# Patient Record
Sex: MALE | Race: OTHER | NOT HISPANIC OR LATINO | Employment: FULL TIME | URBAN - METROPOLITAN AREA
[De-identification: names, ages, dates, MRNs, and addresses within clinical notes are randomized per-mention and may not be internally consistent; named-entity substitution may affect disease eponyms.]

---

## 2017-08-06 ENCOUNTER — HOSPITAL ENCOUNTER (EMERGENCY)
Facility: HOSPITAL | Age: 62
Discharge: HOME | End: 2017-08-06
Attending: EMERGENCY MEDICINE | Admitting: EMERGENCY MEDICINE
Payer: COMMERCIAL

## 2017-08-06 ENCOUNTER — APPOINTMENT (EMERGENCY)
Dept: RADIOLOGY | Facility: HOSPITAL | Age: 62
End: 2017-08-06
Payer: COMMERCIAL

## 2017-08-06 VITALS
SYSTOLIC BLOOD PRESSURE: 183 MMHG | RESPIRATION RATE: 18 BRPM | HEART RATE: 64 BPM | OXYGEN SATURATION: 98 % | DIASTOLIC BLOOD PRESSURE: 99 MMHG | HEIGHT: 72 IN | WEIGHT: 230 LBS | BODY MASS INDEX: 31.15 KG/M2 | TEMPERATURE: 99 F

## 2017-08-06 DIAGNOSIS — R53.1 WEAKNESS: Primary | ICD-10-CM

## 2017-08-06 LAB
ALBUMIN SERPL BCP-MCNC: 3.4 G/DL (ref 3.5–5)
ALP SERPL-CCNC: 77 U/L (ref 46–116)
ALT SERPL W P-5'-P-CCNC: 83 U/L (ref 12–78)
ANION GAP SERPL CALCULATED.3IONS-SCNC: 7 MMOL/L (ref 4–13)
AST SERPL W P-5'-P-CCNC: 61 U/L (ref 5–45)
BACTERIA UR QL AUTO: ABNORMAL /HPF
BASOPHILS # BLD AUTO: 0.05 THOUSANDS/ΜL (ref 0–0.1)
BASOPHILS NFR BLD AUTO: 1 % (ref 0–1)
BILIRUB SERPL-MCNC: 0.47 MG/DL (ref 0.2–1)
BILIRUB UR QL STRIP: NEGATIVE
BUN SERPL-MCNC: 23 MG/DL (ref 5–25)
CALCIUM SERPL-MCNC: 8.6 MG/DL (ref 8.3–10.1)
CHLORIDE SERPL-SCNC: 106 MMOL/L (ref 100–108)
CLARITY UR: CLEAR
CO2 SERPL-SCNC: 28 MMOL/L (ref 21–32)
COLOR UR: YELLOW
COLOR, POC: NORMAL
CREAT SERPL-MCNC: 1.87 MG/DL (ref 0.6–1.3)
EOSINOPHIL # BLD AUTO: 0.36 THOUSAND/ΜL (ref 0–0.61)
EOSINOPHIL NFR BLD AUTO: 5 % (ref 0–6)
ERYTHROCYTE [DISTWIDTH] IN BLOOD BY AUTOMATED COUNT: 13.2 % (ref 11.6–15.1)
GFR SERPL CREATININE-BSD FRML MDRD: 37 ML/MIN/1.73SQ M
GLUCOSE SERPL-MCNC: 88 MG/DL (ref 65–140)
GLUCOSE UR STRIP-MCNC: NEGATIVE MG/DL
HCT VFR BLD AUTO: 46.6 % (ref 36.5–49.3)
HGB BLD-MCNC: 16.5 G/DL (ref 12–17)
HGB UR QL STRIP.AUTO: ABNORMAL
HYALINE CASTS #/AREA URNS LPF: ABNORMAL /LPF
KETONES UR STRIP-MCNC: NEGATIVE MG/DL
LEUKOCYTE ESTERASE UR QL STRIP: ABNORMAL
LYMPHOCYTES # BLD AUTO: 1.98 THOUSANDS/ΜL (ref 0.6–4.47)
LYMPHOCYTES NFR BLD AUTO: 25 % (ref 14–44)
MCH RBC QN AUTO: 31.9 PG (ref 26.8–34.3)
MCHC RBC AUTO-ENTMCNC: 35.4 G/DL (ref 31.4–37.4)
MCV RBC AUTO: 90 FL (ref 82–98)
MONOCYTES # BLD AUTO: 0.78 THOUSAND/ΜL (ref 0.17–1.22)
MONOCYTES NFR BLD AUTO: 10 % (ref 4–12)
NEUTROPHILS # BLD AUTO: 4.73 THOUSANDS/ΜL (ref 1.85–7.62)
NEUTS SEG NFR BLD AUTO: 59 % (ref 43–75)
NITRITE UR QL STRIP: NEGATIVE
NON-SQ EPI CELLS URNS QL MICRO: ABNORMAL /HPF
NRBC BLD AUTO-RTO: 0 /100 WBCS
NT-PROBNP SERPL-MCNC: 202 PG/ML
PH UR STRIP.AUTO: 5.5 [PH] (ref 4.5–8)
PLATELET # BLD AUTO: 227 THOUSANDS/UL (ref 149–390)
PMV BLD AUTO: 10.1 FL (ref 8.9–12.7)
POTASSIUM SERPL-SCNC: 4 MMOL/L (ref 3.5–5.3)
PROT SERPL-MCNC: 7.2 G/DL (ref 6.4–8.2)
PROT UR STRIP-MCNC: NEGATIVE MG/DL
RBC # BLD AUTO: 5.17 MILLION/UL (ref 3.88–5.62)
RBC #/AREA URNS AUTO: ABNORMAL /HPF
SODIUM SERPL-SCNC: 141 MMOL/L (ref 136–145)
SP GR UR STRIP.AUTO: 1.02 (ref 1–1.03)
SPECIMEN SOURCE: NORMAL
TROPONIN I BLD-MCNC: 0.01 NG/ML (ref 0–0.08)
UROBILINOGEN UR QL STRIP.AUTO: 0.2 E.U./DL
WBC # BLD AUTO: 7.92 THOUSAND/UL (ref 4.31–10.16)
WBC #/AREA URNS AUTO: ABNORMAL /HPF

## 2017-08-06 PROCEDURE — 99284 EMERGENCY DEPT VISIT MOD MDM: CPT

## 2017-08-06 PROCEDURE — 85025 COMPLETE CBC W/AUTO DIFF WBC: CPT | Performed by: EMERGENCY MEDICINE

## 2017-08-06 PROCEDURE — 84484 ASSAY OF TROPONIN QUANT: CPT

## 2017-08-06 PROCEDURE — 71020 HB CHEST X-RAY 2VW FRONTAL&LATL: CPT

## 2017-08-06 PROCEDURE — 81001 URINALYSIS AUTO W/SCOPE: CPT

## 2017-08-06 PROCEDURE — 93005 ELECTROCARDIOGRAM TRACING: CPT | Performed by: EMERGENCY MEDICINE

## 2017-08-06 PROCEDURE — 86618 LYME DISEASE ANTIBODY: CPT | Performed by: EMERGENCY MEDICINE

## 2017-08-06 PROCEDURE — 36415 COLL VENOUS BLD VENIPUNCTURE: CPT | Performed by: EMERGENCY MEDICINE

## 2017-08-06 PROCEDURE — 80053 COMPREHEN METABOLIC PANEL: CPT | Performed by: EMERGENCY MEDICINE

## 2017-08-06 PROCEDURE — 96360 HYDRATION IV INFUSION INIT: CPT

## 2017-08-06 PROCEDURE — 81002 URINALYSIS NONAUTO W/O SCOPE: CPT | Performed by: EMERGENCY MEDICINE

## 2017-08-06 PROCEDURE — 83880 ASSAY OF NATRIURETIC PEPTIDE: CPT | Performed by: EMERGENCY MEDICINE

## 2017-08-06 RX ORDER — DOXYCYCLINE HYCLATE 100 MG/1
100 CAPSULE ORAL 2 TIMES DAILY
Qty: 42 CAPSULE | Refills: 0 | Status: SHIPPED | OUTPATIENT
Start: 2017-08-06 | End: 2017-08-16

## 2017-08-06 RX ORDER — DOXYCYCLINE HYCLATE 100 MG/1
100 CAPSULE ORAL ONCE
Status: COMPLETED | OUTPATIENT
Start: 2017-08-06 | End: 2017-08-06

## 2017-08-06 RX ORDER — LOSARTAN POTASSIUM 100 MG/1
75 TABLET ORAL DAILY
COMMUNITY
End: 2018-10-05

## 2017-08-06 RX ORDER — HYDRALAZINE HYDROCHLORIDE 20 MG/ML
5 INJECTION INTRAMUSCULAR; INTRAVENOUS ONCE
Status: DISCONTINUED | OUTPATIENT
Start: 2017-08-06 | End: 2017-08-06

## 2017-08-06 RX ADMIN — DOXYCYCLINE HYCLATE 100 MG: 100 CAPSULE, GELATIN COATED ORAL at 21:39

## 2017-08-06 RX ADMIN — SODIUM CHLORIDE 1000 ML: 0.9 INJECTION, SOLUTION INTRAVENOUS at 19:31

## 2017-08-07 LAB
ATRIAL RATE: 54 BPM
P AXIS: 60 DEGREES
PR INTERVAL: 172 MS
QRS AXIS: -34 DEGREES
QRSD INTERVAL: 106 MS
QT INTERVAL: 438 MS
QTC INTERVAL: 415 MS
T WAVE AXIS: 12 DEGREES
VENTRICULAR RATE: 54 BPM

## 2017-08-08 LAB
B BURGDOR IGG SER IA-ACNC: 0.49
B BURGDOR IGM SER IA-ACNC: 0.32

## 2017-08-28 ENCOUNTER — TRANSCRIBE ORDERS (OUTPATIENT)
Dept: ADMINISTRATIVE | Facility: HOSPITAL | Age: 62
End: 2017-08-28

## 2017-08-28 DIAGNOSIS — R94.31 NONSPECIFIC ABNORMAL ELECTROCARDIOGRAM (ECG) (EKG): Primary | ICD-10-CM

## 2017-09-06 ENCOUNTER — HOSPITAL ENCOUNTER (OUTPATIENT)
Dept: NON INVASIVE DIAGNOSTICS | Facility: CLINIC | Age: 62
Discharge: HOME | End: 2017-09-06
Payer: COMMERCIAL

## 2017-09-06 DIAGNOSIS — R94.31 NONSPECIFIC ABNORMAL ELECTROCARDIOGRAM (ECG) (EKG): ICD-10-CM

## 2017-09-06 LAB
MAX DIASTOLIC BP: 80 MMHG
MAX HEART RATE: 122 BPM
MAX PREDICTED HEART RATE: 157 BPM
MAX. SYSTOLIC BP: 160 MMHG
PROTOCOL NAME: NORMAL
TARGET HR FORMULA: NORMAL
TEST INDICATION: NORMAL
TIME IN EXERCISE PHASE: 232 S

## 2017-09-06 PROCEDURE — 93017 CV STRESS TEST TRACING ONLY: CPT

## 2017-09-20 ENCOUNTER — HOSPITAL ENCOUNTER (OUTPATIENT)
Dept: NON INVASIVE DIAGNOSTICS | Facility: CLINIC | Age: 62
Discharge: HOME | End: 2017-09-20
Payer: COMMERCIAL

## 2017-09-20 DIAGNOSIS — R94.31 NONSPECIFIC ABNORMAL ELECTROCARDIOGRAM (ECG) (EKG): ICD-10-CM

## 2017-09-20 LAB
CHEST PAIN STATEMENT: NORMAL
MAX DIASTOLIC BP: 80 MMHG
MAX HEART RATE: 96 BPM
MAX PREDICTED HEART RATE: 157 BPM
MAX. SYSTOLIC BP: 142 MMHG
PROTOCOL NAME: NORMAL
REASON FOR TERMINATION: NORMAL
TARGET HR FORMULA: NORMAL
TEST INDICATION: NORMAL
TIME IN EXERCISE PHASE: 180 S

## 2017-09-20 PROCEDURE — 93017 CV STRESS TEST TRACING ONLY: CPT

## 2017-09-20 PROCEDURE — 78452 HT MUSCLE IMAGE SPECT MULT: CPT

## 2017-09-20 PROCEDURE — A9502 TC99M TETROFOSMIN: HCPCS

## 2017-09-20 RX ADMIN — REGADENOSON 0.4 MG: 0.08 INJECTION, SOLUTION INTRAVENOUS at 13:50

## 2017-10-04 DIAGNOSIS — I44.1 SECOND DEGREE ATRIOVENTRICULAR BLOCK: ICD-10-CM

## 2017-10-18 ENCOUNTER — TELEPHONE (OUTPATIENT)
Dept: INPATIENT UNIT | Facility: HOSPITAL | Age: 62
End: 2017-10-18

## 2017-10-19 ENCOUNTER — APPOINTMENT (OUTPATIENT)
Dept: RADIOLOGY | Facility: HOSPITAL | Age: 62
End: 2017-10-19
Payer: COMMERCIAL

## 2017-10-19 ENCOUNTER — HOSPITAL ENCOUNTER (OUTPATIENT)
Dept: NON INVASIVE DIAGNOSTICS | Facility: HOSPITAL | Age: 62
Discharge: HOME | End: 2017-10-20
Attending: INTERNAL MEDICINE | Admitting: INTERNAL MEDICINE
Payer: COMMERCIAL

## 2017-10-19 ENCOUNTER — ANESTHESIA (OUTPATIENT)
Dept: INPATIENT UNIT | Facility: HOSPITAL | Age: 62
End: 2017-10-19
Payer: COMMERCIAL

## 2017-10-19 ENCOUNTER — ANESTHESIA EVENT (OUTPATIENT)
Dept: INPATIENT UNIT | Facility: HOSPITAL | Age: 62
End: 2017-10-19
Payer: COMMERCIAL

## 2017-10-19 DIAGNOSIS — I44.1 SECOND DEGREE ATRIOVENTRICULAR BLOCK: ICD-10-CM

## 2017-10-19 LAB
ANION GAP SERPL CALCULATED.3IONS-SCNC: 7 MMOL/L (ref 4–13)
BASOPHILS # BLD AUTO: 0.05 THOUSANDS/ΜL (ref 0–0.1)
BASOPHILS NFR BLD AUTO: 1 % (ref 0–1)
BUN SERPL-MCNC: 17 MG/DL (ref 5–25)
CALCIUM SERPL-MCNC: 8.7 MG/DL (ref 8.3–10.1)
CHLORIDE SERPL-SCNC: 107 MMOL/L (ref 100–108)
CO2 SERPL-SCNC: 27 MMOL/L (ref 21–32)
CREAT SERPL-MCNC: 1.37 MG/DL (ref 0.6–1.3)
EOSINOPHIL # BLD AUTO: 0.37 THOUSAND/ΜL (ref 0–0.61)
EOSINOPHIL NFR BLD AUTO: 5 % (ref 0–6)
ERYTHROCYTE [DISTWIDTH] IN BLOOD BY AUTOMATED COUNT: 12.9 % (ref 11.6–15.1)
GFR SERPL CREATININE-BSD FRML MDRD: 55 ML/MIN/1.73SQ M
GLUCOSE P FAST SERPL-MCNC: 94 MG/DL (ref 65–99)
GLUCOSE SERPL-MCNC: 94 MG/DL (ref 65–140)
HCT VFR BLD AUTO: 48.1 % (ref 36.5–49.3)
HGB BLD-MCNC: 16.5 G/DL (ref 12–17)
LYMPHOCYTES # BLD AUTO: 2.14 THOUSANDS/ΜL (ref 0.6–4.47)
LYMPHOCYTES NFR BLD AUTO: 28 % (ref 14–44)
MAGNESIUM SERPL-MCNC: 2.6 MG/DL (ref 1.6–2.6)
MCH RBC QN AUTO: 31.4 PG (ref 26.8–34.3)
MCHC RBC AUTO-ENTMCNC: 34.3 G/DL (ref 31.4–37.4)
MCV RBC AUTO: 91 FL (ref 82–98)
MONOCYTES # BLD AUTO: 0.59 THOUSAND/ΜL (ref 0.17–1.22)
MONOCYTES NFR BLD AUTO: 8 % (ref 4–12)
NEUTROPHILS # BLD AUTO: 4.4 THOUSANDS/ΜL (ref 1.85–7.62)
NEUTS SEG NFR BLD AUTO: 58 % (ref 43–75)
NRBC BLD AUTO-RTO: 0 /100 WBCS
PLATELET # BLD AUTO: 209 THOUSANDS/UL (ref 149–390)
PMV BLD AUTO: 9.7 FL (ref 8.9–12.7)
POTASSIUM SERPL-SCNC: 4.3 MMOL/L (ref 3.5–5.3)
RBC # BLD AUTO: 5.26 MILLION/UL (ref 3.88–5.62)
SODIUM SERPL-SCNC: 141 MMOL/L (ref 136–145)
WBC # BLD AUTO: 7.57 THOUSAND/UL (ref 4.31–10.16)

## 2017-10-19 PROCEDURE — C1898 LEAD, PMKR, OTHER THAN TRANS: HCPCS

## 2017-10-19 PROCEDURE — C1892 INTRO/SHEATH,FIXED,PEEL-AWAY: HCPCS | Performed by: INTERNAL MEDICINE

## 2017-10-19 PROCEDURE — 71010 HB CHEST X-RAY 1 VIEW FRONTAL (PORTABLE): CPT

## 2017-10-19 PROCEDURE — 80048 BASIC METABOLIC PNL TOTAL CA: CPT | Performed by: PHYSICIAN ASSISTANT

## 2017-10-19 PROCEDURE — 93005 ELECTROCARDIOGRAM TRACING: CPT | Performed by: PHYSICIAN ASSISTANT

## 2017-10-19 PROCEDURE — 33208 INSRT HEART PM ATRIAL & VENT: CPT | Performed by: INTERNAL MEDICINE

## 2017-10-19 PROCEDURE — 83735 ASSAY OF MAGNESIUM: CPT | Performed by: PHYSICIAN ASSISTANT

## 2017-10-19 PROCEDURE — C1785 PMKR, DUAL, RATE-RESP: HCPCS

## 2017-10-19 PROCEDURE — C1769 GUIDE WIRE: HCPCS | Performed by: INTERNAL MEDICINE

## 2017-10-19 PROCEDURE — 85025 COMPLETE CBC W/AUTO DIFF WBC: CPT | Performed by: PHYSICIAN ASSISTANT

## 2017-10-19 RX ORDER — PROPOFOL 10 MG/ML
INJECTION, EMULSION INTRAVENOUS AS NEEDED
Status: DISCONTINUED | OUTPATIENT
Start: 2017-10-19 | End: 2017-10-19 | Stop reason: SURG

## 2017-10-19 RX ORDER — GENTAMICIN SULFATE 40 MG/ML
INJECTION, SOLUTION INTRAMUSCULAR; INTRAVENOUS CODE/TRAUMA/SEDATION MEDICATION
Status: COMPLETED | OUTPATIENT
Start: 2017-10-19 | End: 2017-10-19

## 2017-10-19 RX ORDER — LIDOCAINE HYDROCHLORIDE 10 MG/ML
INJECTION, SOLUTION INFILTRATION; PERINEURAL CODE/TRAUMA/SEDATION MEDICATION
Status: COMPLETED | OUTPATIENT
Start: 2017-10-19 | End: 2017-10-19

## 2017-10-19 RX ORDER — SODIUM CHLORIDE 9 MG/ML
50 INJECTION, SOLUTION INTRAVENOUS CONTINUOUS
Status: CANCELLED | OUTPATIENT
Start: 2017-10-19

## 2017-10-19 RX ORDER — LOSARTAN POTASSIUM 50 MG/1
75 TABLET ORAL DAILY
Status: DISCONTINUED | OUTPATIENT
Start: 2017-10-20 | End: 2017-10-20 | Stop reason: HOSPADM

## 2017-10-19 RX ORDER — ACETAMINOPHEN 325 MG/1
650 TABLET ORAL EVERY 4 HOURS PRN
Status: DISCONTINUED | OUTPATIENT
Start: 2017-10-19 | End: 2017-10-20 | Stop reason: HOSPADM

## 2017-10-19 RX ORDER — FENTANYL CITRATE 50 UG/ML
INJECTION, SOLUTION INTRAMUSCULAR; INTRAVENOUS AS NEEDED
Status: DISCONTINUED | OUTPATIENT
Start: 2017-10-19 | End: 2017-10-19 | Stop reason: SURG

## 2017-10-19 RX ORDER — CEFAZOLIN SODIUM 1 G/3ML
INJECTION, POWDER, FOR SOLUTION INTRAMUSCULAR; INTRAVENOUS AS NEEDED
Status: DISCONTINUED | OUTPATIENT
Start: 2017-10-19 | End: 2017-10-19 | Stop reason: SURG

## 2017-10-19 RX ORDER — SODIUM CHLORIDE 9 MG/ML
INJECTION, SOLUTION INTRAVENOUS CONTINUOUS PRN
Status: DISCONTINUED | OUTPATIENT
Start: 2017-10-19 | End: 2017-10-19 | Stop reason: SURG

## 2017-10-19 RX ORDER — PROPOFOL 10 MG/ML
INJECTION, EMULSION INTRAVENOUS CONTINUOUS PRN
Status: DISCONTINUED | OUTPATIENT
Start: 2017-10-19 | End: 2017-10-19 | Stop reason: SURG

## 2017-10-19 RX ORDER — OXYCODONE HYDROCHLORIDE AND ACETAMINOPHEN 5; 325 MG/1; MG/1
1 TABLET ORAL EVERY 4 HOURS PRN
Status: DISCONTINUED | OUTPATIENT
Start: 2017-10-19 | End: 2017-10-20 | Stop reason: HOSPADM

## 2017-10-19 RX ADMIN — CEFAZOLIN 2000 MG: 1 INJECTION, POWDER, FOR SOLUTION INTRAVENOUS at 16:29

## 2017-10-19 RX ADMIN — PROPOFOL 50 MG: 10 INJECTION, EMULSION INTRAVENOUS at 16:18

## 2017-10-19 RX ADMIN — PROPOFOL 150 MCG/KG/MIN: 10 INJECTION, EMULSION INTRAVENOUS at 16:18

## 2017-10-19 RX ADMIN — SODIUM CHLORIDE: 0.9 INJECTION, SOLUTION INTRAVENOUS at 16:12

## 2017-10-19 RX ADMIN — PROPOFOL 50 MG: 10 INJECTION, EMULSION INTRAVENOUS at 16:12

## 2017-10-19 RX ADMIN — GENTAMICIN SULFATE 80 MG: 40 INJECTION, SOLUTION INTRAMUSCULAR; INTRAVENOUS at 17:06

## 2017-10-19 RX ADMIN — LIDOCAINE HYDROCHLORIDE 20 ML: 10 INJECTION, SOLUTION INFILTRATION; PERINEURAL at 16:42

## 2017-10-19 RX ADMIN — FENTANYL CITRATE 50 MCG: 50 INJECTION, SOLUTION INTRAMUSCULAR; INTRAVENOUS at 16:34

## 2017-10-19 RX ADMIN — IOHEXOL 10 ML: 350 INJECTION, SOLUTION INTRAVENOUS at 16:47

## 2017-10-19 RX ADMIN — FENTANYL CITRATE 50 MCG: 50 INJECTION, SOLUTION INTRAMUSCULAR; INTRAVENOUS at 16:25

## 2017-10-19 NOTE — ANESTHESIA POSTPROCEDURE EVALUATION
Post-Op Assessment Note      CV Status:  Stable    Mental Status:  Alert and awake    Hydration Status:  Euvolemic    PONV Controlled:  Controlled    Airway Patency:  Patent    Post Op Vitals Reviewed: Yes          Staff: CRNA           BP      Temp      Pulse     Resp      SpO2

## 2017-10-19 NOTE — DISCHARGE INSTRUCTIONS
Please refer to post pacemaker implantation discharge instructions and restrictions and your pacemaker booklet/temporary card  Keep incision dry for one week  Do not use lotions/powders/creams on incision  Remove outer bandage 48 hours after implantation  Leave underlying steri-strips in place, they will either fall off on their own or will be removed at 2 week follow up appointment  No overhead reaching/pushing/pulling/lifting greater than 5-10lbs with left arm for one month  Please call the office if you notice redness, swelling, bleeding, or drainage from incision or if you develop fevers  AFTER PACEMAKER CARE:    If you have any questions, please call [phone] to speak with a nurse (8:30am-4pm, or [phone] after hours)  For appointments, please call [phone]  WHAT YOU SHOULD KNOW:   A pacemaker is a small, battery-powered device that is placed under your skin in your upper chest area with wires placed through a vein that lead directly into the heart  It helps regulate your heart rate and prevent your heart from beating too slowly                  AFTER YOU LEAVE:     Medicines:     · Pain medicine: You may need medicine to take away or decrease pain  ¨ Learn how to take your medicine  Ask what medicine and how much you should take  Be sure you know how, when, and how often to take it  Usually Over the counter pain medicine is sufficient to control pain (Acetominophen or Ibuprofen) Ask your doctor if you may take these  If this does not control your pain, narcotic pain killers may be prescribed, please call if you need prescription  ¨ Do not wait until the pain is severe before you take your medicine  Tell caregivers if your pain does not decrease  ¨ Pain medicine can make you dizzy or sleepy  Prevent falls by calling someone when you get out of bed or if you need help  Take your medicine as directed    Call your healthcare provider if you think your medicine is not helping or if you have side effects  Tell him if you are allergic to any medicine  Follow up with your cardiologist after your procedure: You will need a follow-up visit approximately 2 weeks after you leave the hospital  Your cardiologist will check your wound and make sure that your pacemaker is working correctly  Follow the instructions to check your pacemaker: Your cardiologist or primary healthcare provider will check your pacemaker and the battery regularly  He will use a computer to check your pacemaker over the telephone or wireless device which will be given to you  Pacemaker batteries usually last 5 to 10 years  The pacemaker unit will be replaced when the battery gets low  This is a simpler procedure than the original one to implant your pacemaker  Wound care:  Keep your incision dry for one week  Sponge/tub baths are preferred, try to avoid a shower for 7 days  Do not use lotions/powders/creams on incision  Remove outer bandage 48 hours after implantation  Leave underlying steri-strips in place, they will either fall off on their own or will be removed at 2 week follow up appointment  Please call the office if you notice redness, swelling, bleeding, or drainage from incision or if you develop fevers  Activity:   · Arm movement and lifting:  Be careful using the arm on the side of your pacemaker  Do not move your arm for the first 24 hours after your procedure  Do not  lift your arm above your shoulder or lift more than 10 pounds for one month after your procedure  Avoid pushing, pulling, or repetitive arm movements for one month  This helps the leads stay in place and helps your wound heal  Ask your caregiver when you can drive after your procedure  You may move your arm side to side without lifting above your shoulder, and do not need to wear a sling at home     · Typically driving is allowed after 1 week post pacemaker if you are stable, however in some cases it may be longer and you should discuss with your doctor before proceeding  · Sports:  Ask your caregiver when it is okay to play tennis, golf, basketball, or any sport that requires you to lift your arms  Do not play full contact sports, such as football, that could damage your pacemaker  Ask your cardiologist or primary healthcare provider how much and what kinds of physical activity are safe for you  Living with a pacemaker:   · Tell all caregivers you have a pacemaker: This includes surgeons, radiologists, and medical technicians  You may want to wear a medical alert ID bracelet or necklace that states that you have a pacemaker  · Carry your pacemaker ID card: Make sure you receive a pacemaker ID card  Carry it with you at all times  It lists important information about your pacemaker  Show it to airport security if you travel  · Avoid electrical interference:  Avoid welding equipment and other equipment with large magnets or electric fields  These things could interfere with how your pacemaker works  Use your cell phone on the ear opposite from your pacemaker  Do not carry your cell phone in your shirt pocket over your chest      · Some Pacemakers are MRI safe  Ask you doctor if it is safe to proceed with MRI and let the radiologist and staff know you have a pacemaker  · Do not touch the skin around your pacemaker: This can cause damage to the lead wires or move the pacemaker unit from where it should be  Contact your cardiologist or primary healthcare provider if:   · The area around your pacemaker has increasing amount of pain after surgery  The pain should improve over first few days after implantation  · The skin around your stitches has increasing redness, swelling, or has drainage  This may mean that you have an infection  · You have a fever  · You have chills, a cough, and feel weak or achy  These are also signs of infection  · Your feet or ankles are more swollen than your baseline  · Your Heart rate is less than 50 beats per minute     Seek care immediately if:   · Your bandage becomes soaked with blood  · Your pacemaker is swelling rapidly    · Your stitches open up  · You feel your heart suddenly beating very slowly or quickly  · You become too weak or dizzy to stand, or you pass out  · Your arm or leg feels warm, tender, and painful  It may look swollen and red  · You have chest pain that does not go away with rest or medicine  · You feel lightheaded, short of breath, and have chest pain  · You cough up blood  © 2014 [address] is for End User's use only and may not be sold, redistributed or otherwise used for commercial purposes  All illustrations and images included in CareNotes® are the copyrighted property of [employer]  or [address]  The above information is an  only  It is not intended as medical advice for individual conditions or treatments  Talk to your doctor, nurse or pharmacist before following any medical regimen to see if it is safe and effective for you

## 2017-10-19 NOTE — ANESTHESIA PREPROCEDURE EVALUATION
Review of Systems/Medical History  Patient summary reviewed  Chart reviewed  No history of anesthetic complications     Cardiovascular  EKG reviewed, Hypertension , Dysrhythmias, ,   Comment: Max HR 54 with stress test  Good LV, no perfusion defects  ,  Pulmonary  Smoker cigar smoker , ,        GI/Hepatic  Negative GI/hepatic ROS          Negative  ROS        Endo/Other  Negative endo/other ROS      GYN       Hematology  Negative hematology ROS      Musculoskeletal       Neurology  Negative neurology ROS      Psychology   Negative psychology ROS            Physical Exam    Airway    Mallampati score: II  TM Distance: >3 FB  Neck ROM: full     Dental   No notable dental hx     Cardiovascular      Pulmonary      Other Findings        Anesthesia Plan  ASA Score- 2       Anesthesia Type- IV sedation with anesthesia with ASA Monitors  Additional Monitors:   Airway Plan:     Comment: Poss GA B/U plan  Induction- intravenous  Informed Consent- Anesthetic plan and risks discussed with patient  I personally reviewed this patient with the CRNA  Discussed and agreed on the Anesthesia Plan with the CRNA  Shelia Sanchez

## 2017-10-20 ENCOUNTER — APPOINTMENT (OUTPATIENT)
Dept: RADIOLOGY | Facility: HOSPITAL | Age: 62
End: 2017-10-20
Attending: INTERNAL MEDICINE
Payer: COMMERCIAL

## 2017-10-20 VITALS
TEMPERATURE: 100 F | DIASTOLIC BLOOD PRESSURE: 90 MMHG | SYSTOLIC BLOOD PRESSURE: 156 MMHG | HEIGHT: 72 IN | WEIGHT: 230 LBS | HEART RATE: 60 BPM | RESPIRATION RATE: 18 BRPM | BODY MASS INDEX: 31.15 KG/M2 | OXYGEN SATURATION: 95 %

## 2017-10-20 LAB
ANION GAP SERPL CALCULATED.3IONS-SCNC: 6 MMOL/L (ref 4–13)
ATRIAL RATE: 59 BPM
ATRIAL RATE: 60 BPM
BUN SERPL-MCNC: 18 MG/DL (ref 5–25)
CALCIUM SERPL-MCNC: 8.6 MG/DL (ref 8.3–10.1)
CHLORIDE SERPL-SCNC: 107 MMOL/L (ref 100–108)
CO2 SERPL-SCNC: 28 MMOL/L (ref 21–32)
CREAT SERPL-MCNC: 1.37 MG/DL (ref 0.6–1.3)
ERYTHROCYTE [DISTWIDTH] IN BLOOD BY AUTOMATED COUNT: 13.2 % (ref 11.6–15.1)
GFR SERPL CREATININE-BSD FRML MDRD: 55 ML/MIN/1.73SQ M
GLUCOSE SERPL-MCNC: 98 MG/DL (ref 65–140)
HCT VFR BLD AUTO: 48.6 % (ref 36.5–49.3)
HGB BLD-MCNC: 16.9 G/DL (ref 12–17)
MCH RBC QN AUTO: 31.5 PG (ref 26.8–34.3)
MCHC RBC AUTO-ENTMCNC: 34.8 G/DL (ref 31.4–37.4)
MCV RBC AUTO: 91 FL (ref 82–98)
P AXIS: -20 DEGREES
P AXIS: 36 DEGREES
PLATELET # BLD AUTO: 210 THOUSANDS/UL (ref 149–390)
PMV BLD AUTO: 9.9 FL (ref 8.9–12.7)
POTASSIUM SERPL-SCNC: 4.5 MMOL/L (ref 3.5–5.3)
PR INTERVAL: 154 MS
PR INTERVAL: 164 MS
QRS AXIS: -34 DEGREES
QRS AXIS: 17 DEGREES
QRSD INTERVAL: 114 MS
QRSD INTERVAL: 118 MS
QT INTERVAL: 438 MS
QT INTERVAL: 444 MS
QTC INTERVAL: 438 MS
QTC INTERVAL: 439 MS
RBC # BLD AUTO: 5.36 MILLION/UL (ref 3.88–5.62)
SODIUM SERPL-SCNC: 141 MMOL/L (ref 136–145)
T WAVE AXIS: -3 DEGREES
T WAVE AXIS: 0 DEGREES
VENTRICULAR RATE: 59 BPM
VENTRICULAR RATE: 60 BPM
WBC # BLD AUTO: 7.96 THOUSAND/UL (ref 4.31–10.16)

## 2017-10-20 PROCEDURE — 80048 BASIC METABOLIC PNL TOTAL CA: CPT | Performed by: PHYSICIAN ASSISTANT

## 2017-10-20 PROCEDURE — 71020 HB CHEST X-RAY 2VW FRONTAL&LATL: CPT

## 2017-10-20 PROCEDURE — 85027 COMPLETE CBC AUTOMATED: CPT | Performed by: PHYSICIAN ASSISTANT

## 2017-10-20 RX ADMIN — LOSARTAN POTASSIUM 75 MG: 50 TABLET, FILM COATED ORAL at 09:56

## 2017-10-20 NOTE — PROGRESS NOTES
Patient with no acute issues overnight, denies chest pain, dyspnea, palpitations, dizziness or lightheadedness  Incision is c/d/i without swelling, hematoma, redness, bleeding, drainage, or signs of infection  CXR shows appropriate device placement without pneumothorax  VSS, labs reviewed  Device interrogation shows appropriate device function, including lead sensing, threshold, and impedance  Physical exam shows patient in NAD, AAO x 3, RRR no murmurs, rubs, or gallops appreciated, lungs CTA b/l without wheezes, rhonchi or rales, LE with no significant clubbing, cyanosis, or edema b/l  Telemetry shows atrial pacing with intact conduction, no arrhythmias  Discharge instructions and restrictions reviewed in detail, follow up appointment arranged  Patient will continue on prior medical regimen of losartan  No changes were made  Patient is stable for discharge at this time with all questions answered

## 2017-10-20 NOTE — CASE MANAGEMENT
10/19/17 1603  Outpatient No Charge Bed Once     Transfer Service: Cardiology       Question Answer Comment   Admitting Physician Gisela Green    Bed request comments telemetry            Cardiac pacer implant    /90   Pulse 60   Temp 100 °F (37 8 °C) (Oral)   Resp 18   Ht 6' (1 829 m)   Wt 104 kg (230 lb)   SpO2 95%   BMI 31 19 kg/m²     Scheduled Meds:  losartan 75 mg Oral Daily     Continuous Infusions:   PRN Meds:   acetaminophen    oxyCODONE-acetaminophen        ------------------------------------------------------------------------------------------------------------------------------    Lashawn Menon PA-C Physician Assistant Cosign Needed Electrophysiology  Progress Notes Date of Service: 10/20/2017  9:32 AM   Patient with no acute issues overnight, denies chest pain, dyspnea, palpitations, dizziness or lightheadedness  Incision is c/d/i without swelling, hematoma, redness, bleeding, drainage, or signs of infection  CXR shows appropriate device placement without pneumothorax  VSS, labs reviewed  Device interrogation shows appropriate device function, including lead sensing, threshold, and impedance  Physical exam shows patient in NAD, AAO x 3, RRR no murmurs, rubs, or gallops appreciated, lungs CTA b/l without wheezes, rhonchi or rales, LE with no significant clubbing, cyanosis, or edema b/l  Telemetry shows atrial pacing with intact conduction, no arrhythmias  Discharge instructions and restrictions reviewed in detail, follow up appointment arranged  Patient will continue on prior medical regimen of losartan  No changes were made   Patient is stable for discharge at this time with all questions answered

## 2018-01-17 ENCOUNTER — ALLSCRIPTS OFFICE VISIT (OUTPATIENT)
Dept: OTHER | Facility: OTHER | Age: 63
End: 2018-01-17

## 2018-01-17 ENCOUNTER — GENERIC CONVERSION - ENCOUNTER (OUTPATIENT)
Dept: OTHER | Facility: OTHER | Age: 63
End: 2018-01-17

## 2018-03-07 NOTE — PROGRESS NOTES
"  Discussion/Summary  Normal device function      Results/Data  Cardiac Device In Clinic [id number] 08:03PM Sarah Rodriguez     Test Name Result Flag Reference   MISCELLANEOUS COMMENT (Report)     DEVICE INTERROGATED IN THE [place] OFFICE  BATTERY VOLTAGE ADEQUATE (10 5 YRS)  AP 31 1%  0 4%  ALL LEAD PARAMETERS WITHIN NORMAL LIMITS  NO SIGNIFICANT HIGH RATE EPISODES  DECREASE MADE TO RA & RV AMPLITUDE TO PROMOTE DEVICE LONGEVITY WHILE MAINTAINING AN APPROPRIATE SAFETY MARGIN  PACEMAKER FUNCTIONING APPROPRIATELY  CP   Cardiac Electrophysiology Report      [id number]  pdf   DEVICE TYPE Pacemaker       Cardiac Electrophysiology Report [id number] 08:03PM Sarah Rodriguez     Test Name Result Flag Reference   Cardiac Electrophysiology Report      [id number]  pdf     Signatures   Electronically signed by : Neo Ortiz, ; Jan 17 2018  3:46PM EST                       (Author)    Electronically signed by : Lukasz Flores DO; Jan 21 2018  3:44PM EST                       (Author)    "

## 2018-04-20 ENCOUNTER — IN-CLINIC DEVICE VISIT (OUTPATIENT)
Dept: CARDIOLOGY CLINIC | Facility: CLINIC | Age: 63
End: 2018-04-20
Payer: COMMERCIAL

## 2018-04-20 DIAGNOSIS — I44.1 AV BLOCK, 2ND DEGREE: Primary | ICD-10-CM

## 2018-04-20 DIAGNOSIS — Z95.0 CARDIAC PACEMAKER: ICD-10-CM

## 2018-04-20 PROCEDURE — 93296 REM INTERROG EVL PM/IDS: CPT | Performed by: INTERNAL MEDICINE

## 2018-05-31 NOTE — PROGRESS NOTES
DR Veena Valerio MDT DUAL PACEMAKER  CARELINK TRANSMISSION: BATTERY VOLTAGE ADEQUATE (10 YRS)  AP - 37 4%  - 35 3%  ALL AVAILABLE LEAD PARAMETERS WITHIN NORMAL LIMITS  NO SIGNIFICANT HIGH RATE EPISODES  NORMAL DEVICE FUNCTION     eb

## 2018-07-23 ENCOUNTER — REMOTE DEVICE CLINIC VISIT (OUTPATIENT)
Dept: CARDIOLOGY CLINIC | Facility: CLINIC | Age: 63
End: 2018-07-23
Payer: COMMERCIAL

## 2018-07-23 DIAGNOSIS — Z95.0 PACEMAKER: ICD-10-CM

## 2018-07-23 DIAGNOSIS — I44.30 AV BLOCK: Primary | ICD-10-CM

## 2018-07-23 PROCEDURE — 93296 REM INTERROG EVL PM/IDS: CPT | Performed by: INTERNAL MEDICINE

## 2018-07-23 NOTE — PROGRESS NOTES
Results for orders placed or performed in visit on 07/23/18   Cardiac EP device report    Narrative    DR DANNY WAYNET DUAL PACEMAKER  CARELINK TRANSMISSION: BATTERY ADEQUATE (9 YRS)  AP 54%;  90%  ALL LEAD PARAMETERS WITHIN NORMAL LIMITS  1 MONITORED AT-AF EPISODE WITH AVAILABLE MARKER/EGM SHOWING AT WITH COMPETITIVE ATRIAL PACING  NORMAL DEVICE FUNCTION   PL

## 2018-10-05 ENCOUNTER — HOSPITAL ENCOUNTER (OUTPATIENT)
Facility: HOSPITAL | Age: 63
Setting detail: OBSERVATION
Discharge: HOME | End: 2018-10-05
Attending: EMERGENCY MEDICINE | Admitting: INTERNAL MEDICINE
Payer: COMMERCIAL

## 2018-10-05 ENCOUNTER — APPOINTMENT (EMERGENCY)
Dept: RADIOLOGY | Facility: HOSPITAL | Age: 63
End: 2018-10-05
Payer: COMMERCIAL

## 2018-10-05 VITALS
HEIGHT: 72 IN | WEIGHT: 214 LBS | HEART RATE: 65 BPM | TEMPERATURE: 98 F | RESPIRATION RATE: 20 BRPM | BODY MASS INDEX: 28.99 KG/M2 | SYSTOLIC BLOOD PRESSURE: 135 MMHG | OXYGEN SATURATION: 96 % | DIASTOLIC BLOOD PRESSURE: 85 MMHG

## 2018-10-05 DIAGNOSIS — R42 LIGHTHEADEDNESS: Primary | ICD-10-CM

## 2018-10-05 DIAGNOSIS — I62.9 INTRACRANIAL BLEED (HCC): ICD-10-CM

## 2018-10-05 PROBLEM — I10 HYPERTENSION: Status: ACTIVE | Noted: 2018-10-05

## 2018-10-05 LAB
ALBUMIN SERPL BCP-MCNC: 3.8 G/DL (ref 3.5–5)
ALP SERPL-CCNC: 80 U/L (ref 46–116)
ALT SERPL W P-5'-P-CCNC: 50 U/L (ref 12–78)
ANION GAP SERPL CALCULATED.3IONS-SCNC: 5 MMOL/L (ref 4–13)
AST SERPL W P-5'-P-CCNC: 29 U/L (ref 5–45)
ATRIAL RATE: 59 BPM
BACTERIA UR QL AUTO: ABNORMAL /HPF
BASOPHILS # BLD AUTO: 0.05 THOUSANDS/ΜL (ref 0–0.1)
BASOPHILS NFR BLD AUTO: 1 % (ref 0–1)
BILIRUB SERPL-MCNC: 0.86 MG/DL (ref 0.2–1)
BILIRUB UR QL STRIP: NEGATIVE
BUN SERPL-MCNC: 23 MG/DL (ref 5–25)
CALCIUM SERPL-MCNC: 9.1 MG/DL (ref 8.3–10.1)
CHLORIDE SERPL-SCNC: 105 MMOL/L (ref 100–108)
CLARITY UR: ABNORMAL
CO2 SERPL-SCNC: 30 MMOL/L (ref 21–32)
COLOR UR: YELLOW
CREAT SERPL-MCNC: 1.36 MG/DL (ref 0.6–1.3)
EOSINOPHIL # BLD AUTO: 0.26 THOUSAND/ΜL (ref 0–0.61)
EOSINOPHIL NFR BLD AUTO: 3 % (ref 0–6)
ERYTHROCYTE [DISTWIDTH] IN BLOOD BY AUTOMATED COUNT: 12.7 % (ref 11.6–15.1)
GFR SERPL CREATININE-BSD FRML MDRD: 55 ML/MIN/1.73SQ M
GLUCOSE SERPL-MCNC: 97 MG/DL (ref 65–140)
GLUCOSE UR STRIP-MCNC: NEGATIVE MG/DL
HCT VFR BLD AUTO: 49 % (ref 36.5–49.3)
HGB BLD-MCNC: 16.4 G/DL (ref 12–17)
HGB UR QL STRIP.AUTO: NEGATIVE
IMM GRANULOCYTES # BLD AUTO: 0.02 THOUSAND/UL (ref 0–0.2)
IMM GRANULOCYTES NFR BLD AUTO: 0 % (ref 0–2)
KETONES UR STRIP-MCNC: NEGATIVE MG/DL
LEUKOCYTE ESTERASE UR QL STRIP: ABNORMAL
LYMPHOCYTES # BLD AUTO: 1.57 THOUSANDS/ΜL (ref 0.6–4.47)
LYMPHOCYTES NFR BLD AUTO: 21 % (ref 14–44)
MAGNESIUM SERPL-MCNC: 2.5 MG/DL (ref 1.6–2.6)
MCH RBC QN AUTO: 30.7 PG (ref 26.8–34.3)
MCHC RBC AUTO-ENTMCNC: 33.5 G/DL (ref 31.4–37.4)
MCV RBC AUTO: 92 FL (ref 82–98)
MONOCYTES # BLD AUTO: 0.5 THOUSAND/ΜL (ref 0.17–1.22)
MONOCYTES NFR BLD AUTO: 7 % (ref 4–12)
NEUTROPHILS # BLD AUTO: 5.23 THOUSANDS/ΜL (ref 1.85–7.62)
NEUTS SEG NFR BLD AUTO: 68 % (ref 43–75)
NITRITE UR QL STRIP: NEGATIVE
NON-SQ EPI CELLS URNS QL MICRO: ABNORMAL /HPF
NRBC BLD AUTO-RTO: 0 /100 WBCS
P AXIS: 91 DEGREES
PH UR STRIP.AUTO: 5.5 [PH] (ref 4.5–8)
PLATELET # BLD AUTO: 231 THOUSANDS/UL (ref 149–390)
PMV BLD AUTO: 9.7 FL (ref 8.9–12.7)
POTASSIUM SERPL-SCNC: 4.4 MMOL/L (ref 3.5–5.3)
PR INTERVAL: 184 MS
PROT SERPL-MCNC: 8.2 G/DL (ref 6.4–8.2)
PROT UR STRIP-MCNC: NEGATIVE MG/DL
QRS AXIS: -67 DEGREES
QRSD INTERVAL: 122 MS
QT INTERVAL: 416 MS
QTC INTERVAL: 411 MS
RBC # BLD AUTO: 5.35 MILLION/UL (ref 3.88–5.62)
RBC #/AREA URNS AUTO: ABNORMAL /HPF
SODIUM SERPL-SCNC: 140 MMOL/L (ref 136–145)
SP GR UR STRIP.AUTO: 1 (ref 1–1.03)
T WAVE AXIS: 114 DEGREES
UROBILINOGEN UR QL STRIP.AUTO: 0.2 E.U./DL
VENTRICULAR RATE: 59 BPM
WBC # BLD AUTO: 7.63 THOUSAND/UL (ref 4.31–10.16)
WBC #/AREA URNS AUTO: ABNORMAL /HPF

## 2018-10-05 PROCEDURE — 93010 ELECTROCARDIOGRAM REPORT: CPT | Performed by: INTERNAL MEDICINE

## 2018-10-05 PROCEDURE — 83735 ASSAY OF MAGNESIUM: CPT | Performed by: STUDENT IN AN ORGANIZED HEALTH CARE EDUCATION/TRAINING PROGRAM

## 2018-10-05 PROCEDURE — 70450 CT HEAD/BRAIN W/O DYE: CPT

## 2018-10-05 PROCEDURE — 93005 ELECTROCARDIOGRAM TRACING: CPT

## 2018-10-05 PROCEDURE — 71046 X-RAY EXAM CHEST 2 VIEWS: CPT

## 2018-10-05 PROCEDURE — 36415 COLL VENOUS BLD VENIPUNCTURE: CPT | Performed by: STUDENT IN AN ORGANIZED HEALTH CARE EDUCATION/TRAINING PROGRAM

## 2018-10-05 PROCEDURE — 81001 URINALYSIS AUTO W/SCOPE: CPT | Performed by: INTERNAL MEDICINE

## 2018-10-05 PROCEDURE — 99285 EMERGENCY DEPT VISIT HI MDM: CPT

## 2018-10-05 PROCEDURE — 85025 COMPLETE CBC W/AUTO DIFF WBC: CPT | Performed by: STUDENT IN AN ORGANIZED HEALTH CARE EDUCATION/TRAINING PROGRAM

## 2018-10-05 PROCEDURE — 80053 COMPREHEN METABOLIC PANEL: CPT | Performed by: STUDENT IN AN ORGANIZED HEALTH CARE EDUCATION/TRAINING PROGRAM

## 2018-10-05 RX ORDER — LOSARTAN POTASSIUM 100 MG/1
75 TABLET ORAL
COMMUNITY
End: 2018-11-12 | Stop reason: SDUPTHER

## 2018-10-05 RX ORDER — AMLODIPINE BESYLATE 5 MG/1
5 TABLET ORAL DAILY
COMMUNITY
End: 2018-11-12 | Stop reason: SDUPTHER

## 2018-10-05 RX ORDER — AMLODIPINE BESYLATE 5 MG/1
5 TABLET ORAL DAILY
Status: DISCONTINUED | OUTPATIENT
Start: 2018-10-06 | End: 2018-10-06 | Stop reason: HOSPADM

## 2018-10-05 RX ORDER — HYDRALAZINE HYDROCHLORIDE 20 MG/ML
5 INJECTION INTRAMUSCULAR; INTRAVENOUS EVERY 8 HOURS PRN
Status: DISCONTINUED | OUTPATIENT
Start: 2018-10-05 | End: 2018-10-06 | Stop reason: HOSPADM

## 2018-10-05 RX ADMIN — HYDRALAZINE HYDROCHLORIDE 5 MG: 20 INJECTION INTRAMUSCULAR; INTRAVENOUS at 19:05

## 2018-10-05 NOTE — PROGRESS NOTES
[facility] [place] [facility]   Unit/Bed # @DBLINK (SHEILA,55548)@ Encounter: [id number]  SOD Team C           Solitario Negron 58 y o  male [id number]       Patient seen and examined  Reviewed H&P per Dr Kenney Player  Agree with the assessment and plan unless otherwise noted  58year old male with past medical history of bradycardia sp pacemaker and htn who presented to B with complaint of left leg weakness  Pt felt weak and felt like his leg was going to give out, he also felt that his leg was numb  Pt reported that all of this started about one week ago  He has been feeling these symptoms on and off and today they were worse so he decided to come in  Pt decided to come into the ED  ROS was negative  CT head showed punctate hyperdense foci in the right corona radiata, there was no surrounding edema and this likely represents dystrophic calcification or possibly developmental venous anomalies; however, without prior imaging, small subarachnoid blood could not  be excluded  Pt will be admitted under obs to SOD C       Lipids:  LDL 80  HDL 48 Trig 108 in 8/2018     Assessment/Plan: Principal Problem:    Intracranial bleed (HCC)  Active Problems:    Hypertension     Plan:   -Admit obs   -CT in 24 hours   -Continue home meds   -Check A1c     Disposition:  OBSERVATION    Expected LOS: <2 9 Lindsey Shore DO

## 2018-10-05 NOTE — ED ATTENDING ATTESTATION
Emery Cavanaugh DO, saw and evaluated the patient  I have discussed the patient with the resident/non-physician practitioner and agree with the resident's/non-physician practitioner's findings, Plan of Care, and MDM as documented in the resident's/non-physician practitioner's note, except where noted  All available labs and Radiology studies were reviewed  At this point I agree with the current assessment done in the Emergency Department  I have conducted an independent evaluation of this patient a history and physical is as follows:    58 yom became lightheaded while working outside  Had tingling in l foot  No HA    Past Medical History:   Diagnosis Date    Bradycardia      Past Surgical History:   Procedure Laterality Date    CARDIAC PACEMAKER PLACEMENT  2017     /84 (BP Location: Right arm)   Pulse 59   Temp 97 8 °F (36 6 °C) (Oral)   Resp 18   Ht 6' (1 829 m)   Wt 97 1 kg (214 lb)   SpO2 97%   BMI 29 02 kg/m²   A&Ox4  CN 2-12 grossly intact  CTA  RRR  Ab soft, NT    CT finding possibly c/w SAH per radiology  Presentation inconsistent with subarachnoid hemorrhage, will discuss with Neurology and recommended admission for serial neurologic exams and repeat CT of the brain tomorrow as recommended by Radiology      Diagnosis  Lightheaded  Abnormal CT      Critical Care Time  CritCare Time    Procedures

## 2018-10-05 NOTE — ED PROVIDER NOTES
History  Chief Complaint   Patient presents with    Dizziness     Pt" I was at work ealier when my legs started to feel like they were going to out on me  I started to get a little dizzy ' Pt denies CP, SOB, n/v  Pt denies previous occurances in the past  Pt c/o numbness on the "ball of the left foot"      This is a 65-year-old male with a past medical history of bradycardia status post pacemaker, and hypertension who presents to the emergency department this afternoon with lightheadedness  Patient states that he was at work, outside when he got up from sitting and took a few steps before feeling a little lightheaded  Patient felt like his legs, especially his left leg, were about to give out so he walked a little further and sat down on the chair, feeling a little better afterwards  Patient states that he still feels a little bit of numbness and tingling in the ball of his left foot so he decided to come to the emergency department for further evaluation  Patient denies any headaches, change in vision, weakness, numbness, fevers, chills, nausea, vomiting, diarrhea, constipation, chest pain, shortness of breath, dysuria, hematuria, or extremity swelling  Patient states that his brother was recently diagnosed with brain cancer and lost function in his left leg so he was concerned that he might be suffering from the same thing  Patient takes amlodipine and losartan at home for his high blood pressure and states that he has been taking it as prescribed and new medication is new  He is only taking vitamin-C as a supplement  Patient smokes a cigar weekly, denies alcohol and denies drug use  Prior to Admission Medications   Prescriptions Last Dose Informant Patient Reported? Taking?    amLODIPine (NORVASC) 5 mg tablet 10/5/2018 at 0400  Yes Yes   Sig: Take 5 mg by mouth daily   losartan (COZAAR) 100 MG tablet 10/5/2018 at 0400  Yes No   Sig: Take 75 mg by mouth      Facility-Administered Medications: None       Past Medical History:   Diagnosis Date    Bradycardia        Past Surgical History:   Procedure Laterality Date    CARDIAC PACEMAKER PLACEMENT  2017       No family history on file  I have reviewed and agree with the history as documented  Social History   Substance Use Topics    Smoking status: Current Some Day Smoker     Types: Cigars    Smokeless tobacco: Not on file      Comment: 1 a week    Alcohol use No        Review of Systems   Constitutional: Negative for chills, diaphoresis and fever  HENT: Negative for congestion, rhinorrhea, sinus pressure and sore throat  Eyes: Negative for visual disturbance  Respiratory: Negative for cough, chest tightness and shortness of breath  Cardiovascular: Negative for chest pain  Gastrointestinal: Negative for abdominal pain, constipation, diarrhea, nausea and vomiting  Genitourinary: Negative for dysuria, frequency, hematuria and urgency  Musculoskeletal: Negative for arthralgias and myalgias  Skin: Negative for color change and rash  Neurological: Positive for light-headedness  Negative for dizziness, numbness (Paresthesia in the left ball of his foot) and headaches  Physical Exam  ED Triage Vitals [10/05/18 1301]   Temperature Pulse Respirations Blood Pressure SpO2   97 8 °F (36 6 °C) 60 18 142/79 98 %      Temp Source Heart Rate Source Patient Position - Orthostatic VS BP Location FiO2 (%)   Oral Monitor Sitting Left arm --      Pain Score       1           Orthostatic Vital Signs  Vitals:    10/05/18 1301 10/05/18 1556 10/05/18 1741 10/05/18 1902   BP: 142/79 155/84 145/94 149/89   Pulse: 60 59 60 59   Patient Position - Orthostatic VS: Sitting Lying Lying Sitting       Physical Exam   Constitutional: He is oriented to person, place, and time  He appears well-developed and well-nourished  No distress  HENT:   Head: Normocephalic and atraumatic  Eyes: Pupils are equal, round, and reactive to light   Conjunctivae are normal  Neck: Normal range of motion  Neck supple  No JVD present  Cardiovascular: Normal rate, regular rhythm and normal heart sounds  Exam reveals no gallop and no friction rub  No murmur heard  Pulmonary/Chest: Effort normal and breath sounds normal  No stridor  No respiratory distress  He has no wheezes  He has no rales  Abdominal: Soft  Bowel sounds are normal  He exhibits no distension  There is no tenderness  There is no guarding  Musculoskeletal: Normal range of motion  He exhibits no edema, tenderness or deformity  Neurological: He is alert and oriented to person, place, and time  No cranial nerve deficit or sensory deficit  He exhibits normal muscle tone  Coordination normal  GCS eye subscore is 4  GCS verbal subscore is 5  GCS motor subscore is 6  Patient with 5/5 strength in all extremities  Mild decrease in sensation to light touch on the left dorsal surface of his foot  Sensation intact to light touch bilaterally in all other areas  Skin: Skin is warm and dry  No rash noted  He is not diaphoretic  No erythema  No pallor  Psychiatric: He has a normal mood and affect  His behavior is normal    Nursing note and vitals reviewed        ED Medications  Medications   losartan (COZAAR) tablet 75 mg (not administered)   nicotine (NICODERM CQ) 7 mg/24hr TD 24 hr patch 1 patch (not administered)   hydrALAZINE (APRESOLINE) injection 5 mg (5 mg Intravenous Given 10/5/18 1905)   amLODIPine (NORVASC) tablet 5 mg (not administered)       Diagnostic Studies  Results Reviewed     Procedure Component Value Units Date/Time    Troponin I [[id number]] Collected:  10/05/18 1619    Lab Status:  No result Specimen:  Blood from Arm, Right     Comprehensive metabolic panel [[id number]]  (Abnormal) Collected:  10/05/18 1501    Lab Status:  Final result Specimen:  Blood from Arm, Right Updated:  10/05/18 1529     Sodium 140 mmol/L      Potassium 4 4 mmol/L      Chloride 105 mmol/L      CO2 30 mmol/L      ANION GAP 5 mmol/L      BUN 23 mg/dL      Creatinine 1 36 (H) mg/dL      Glucose 97 mg/dL      Calcium 9 1 mg/dL      AST 29 U/L      ALT 50 U/L      Alkaline Phosphatase 80 U/L      Total Protein 8 2 g/dL      Albumin 3 8 g/dL      Total Bilirubin 0 86 mg/dL      eGFR 55 ml/min/1 73sq m     Narrative:         National Kidney Disease Education Program recommendations are as follows:  GFR calculation is accurate only with a steady state creatinine  Chronic Kidney disease less than 60 ml/min/1 73 sq  meters  Kidney failure less than 15 ml/min/1 73 sq  meters  Magnesium [[id number]]  (Normal) Collected:  10/05/18 1501    Lab Status:  Final result Specimen:  Blood from Arm, Right Updated:  10/05/18 1529     Magnesium 2 5 mg/dL     CBC and differential [[id number]] Collected:  10/05/18 1501    Lab Status:  Final result Specimen:  Blood from Arm, Right Updated:  10/05/18 1514     WBC 7 63 Thousand/uL      RBC 5 35 Million/uL      Hemoglobin 16 4 g/dL      Hematocrit 49 0 %      MCV 92 fL      MCH 30 7 pg      MCHC 33 5 g/dL      RDW 12 7 %      MPV 9 7 fL      Platelets 586 Thousands/uL      nRBC 0 /100 WBCs      Neutrophils Relative 68 %      Immat GRANS % 0 %      Lymphocytes Relative 21 %      Monocytes Relative 7 %      Eosinophils Relative 3 %      Basophils Relative 1 %      Neutrophils Absolute 5 23 Thousands/µL      Immature Grans Absolute 0 02 Thousand/uL      Lymphocytes Absolute 1 57 Thousands/µL      Monocytes Absolute 0 50 Thousand/µL      Eosinophils Absolute 0 26 Thousand/µL      Basophils Absolute 0 05 Thousands/µL                  XR chest 2 views   Final Result by Theresa Jasso MD (10/05 1638)      No acute cardiopulmonary disease  Workstation performed: [id number]         CT head without contrast   Final Result by Troy Wolfe MD (10/05 1542)      Punctate hyperdense foci in the right corona radiata    There is no surrounding edema and this likely represents dystrophic calcification or possibly developmental venous anomalies  However, without prior imaging, small subarachnoid blood cannot be    entirely excluded  I personally discussed this study with Cristóbal Krishnan on 10/5/2018 at 3:41 PM                         Workstation performed: [id number]         CT head wo contrast    (Results Pending)         Procedures  ECG 12 Lead Documentation  Date/Time: 10/5/2018 1:56 PM  Performed by: Kirstie Drake  Authorized by: Sarahi VEGA     Indications / Diagnosis:  Lightheadedness  ECG reviewed by me, the ED Provider: yes    Patient location:  ED  Previous ECG:     Previous ECG:  Compared to current    Similarity:  Changes noted    Comparison to cardiac monitor: Yes    Interpretation:     Interpretation: abnormal    Rate:     ECG rate assessment: normal    Rhythm:     Rhythm: sinus rhythm and paced    Pacing:     Capture:  Intermittent    Type of pacing:  Atrial  Ectopy:     Ectopy: none    QRS:     QRS axis:  Left    QRS intervals:  Normal  Conduction:     Conduction: abnormal      Abnormal conduction: incomplete RBBB    ST segments:     ST segments:  Normal  T waves:     T waves: normal    Comments:      AUm=[id number]            Phone Consults  ED Phone Contact    ED Course                               MDM  Number of Diagnoses or Management Options  Lightheadedness:   Diagnosis management comments: Patient's CT scan was unable to rule out subarachnoid hemorrhage  Spoke with the radiologist who states that a CTA of the head and neck would not be helpful and he recommended a follow-up head CT in 24 hours the patient was going to be admitted  Spoke with both Neurology and Neurosurgery who stated that the patient did not need to be admitted to the ICU for this and could go to the general medicine floor  Spoke with  who agreed to admit the patient to their service      CritCare Time    Disposition  Final diagnoses:   Lightheadedness     Time reflects when diagnosis was documented in both MDM as applicable and the Disposition within this note     Time User Action Codes Description Comment    10/5/2018  4:58 PM Yolette Busby Add [R42] Lightheadedness       ED Disposition     ED Disposition Condition Comment    Admit  Case was discussed with SOD and the patient's admission status was agreed to be Admission Status: observation status to the service of Dr Tatum Soni   Follow-up Information    None         Current Discharge Medication List      CONTINUE these medications which have NOT CHANGED    Details   amLODIPine (NORVASC) 5 mg tablet Take 5 mg by mouth daily      losartan (COZAAR) 100 MG tablet Take 75 mg by mouth           No discharge procedures on file  ED Provider  Attending physically available and evaluated Luther Chavis I managed the patient along with the ED Attending      Electronically Signed by         Donis Montes MD  10/05/18 9762

## 2018-10-05 NOTE — H&P
INTERNAL MEDICINE HISTORY AND PHYSICAL  [facility] [address] SOD Team C     NAME: Maralyn Duane  AGE: 58 y o  SEX: male  : 1955   MRN: [id number]  ENCOUNTER: [id number]    DATE: 10/5/2018  TIME: 6:27 PM    Primary Care Physician: Karolina Gamez DO  Admitting Provider: Yaima Rincon MD    Chief complaint: Weakness/numbess in left foot    History of Present Illness     Maralyn Duane is a 58 y o  male with PMHx of HTN, bradycardia s/p pacemaker   Patient reports that he has had numbness at the plantar aspect of his foot for about 2 months  This Monday at work he got up to walk around and noticed that his left foot felt slightly weak, like it was going to give out, but it did not disturb his gait or affect his ability to walk  He felt some numbness and tingling on the "balls of my feet"  Over the course of this week he noticed that these episodes of weakness and numbness were increasing in frequency  These episodes would usually only last for a couple steps before resolving, but today it lasted for a longer time so he decided to come to the ER  The patient also reports feeling lightheaded after standing up  The lightheaded feeling would pass after he took a couple of steps, it always seem to occur directly after he goes from a sitting to standing position  Upon presentation to the ER his vitals were all WNL, apart from BP of 142/79  A CT scan of the head demonstrated punctate hyperdense foci in the right corona radiata, likely representing calcification, but a bleed cannot be ruled out  He was admitted for a rule out of bleed with serial neuro exams and further CT imaging  Review of Systems   Review of Systems   Constitutional: Negative for activity change, appetite change, chills, diaphoresis, fatigue and fever  HENT: Negative for congestion, rhinorrhea, sinus pain, sinus pressure, sore throat and tinnitus  Eyes: Negative for photophobia and visual disturbance     Respiratory: Negative for cough and shortness of breath  Cardiovascular: Negative for chest pain, palpitations and leg swelling  Gastrointestinal: Negative for abdominal distention, abdominal pain, constipation, diarrhea, nausea and vomiting  Genitourinary: Positive for frequency  Negative for dysuria, flank pain and hematuria  Musculoskeletal: Negative for gait problem, joint swelling and myalgias  Skin: Negative for color change, pallor, rash and wound  Neurological: Positive for weakness, light-headedness and numbness  Negative for dizziness, tremors, seizures, syncope, speech difficulty and headaches  Numbness at the plantar aspect of the foot   All other systems reviewed and are negative  Past Medical History     Past Medical History:   Diagnosis Date    Bradycardia        Past Surgical History     Past Surgical History:   Procedure Laterality Date    CARDIAC PACEMAKER PLACEMENT  2017       Social History     History   Alcohol Use No     History   Drug Use No     History   Smoking Status    Current Some Day Smoker    Types: Cigars   Smokeless Tobacco    Not on file     Comment: 1 a week       Family History   No family history on file  Medications Prior to Admission     Prior to Admission medications    Medication Sig Start Date End Date Taking?  Authorizing Provider   amLODIPine (NORVASC) 5 mg tablet Take 5 mg by mouth daily   Yes Historical Provider, MD   losartan (COZAAR) 100 MG tablet Take 75 mg by mouth    Historical Provider, MD   losartan (COZAAR) 100 MG tablet Take 75 mg by mouth daily  10/5/18  Historical Provider, MD       Allergies     Allergies   Allergen Reactions    Bactrim [Sulfamethoxazole-Trimethoprim] Hives       Objective     Vitals:    10/05/18 1301 10/05/18 1556 10/05/18 1741   BP: 142/79 155/84 145/94   BP Location: Left arm Right arm Left arm   Pulse: 60 59 60   Resp: 18 18 18   Temp: 97 8 °F (36 6 °C)     TempSrc: Oral     SpO2: 98% 97% 98%   Weight: 97 1 kg (214 lb)     Height: 6' (1 829 m)       Body mass index is 29 02 kg/m²  No intake or output data in the 24 hours ending 10/05/18 1827  Invasive Devices     Peripheral Intravenous Line            Peripheral IV 10/05/18 Right Antecubital less than 1 day                Physical Exam  GENERAL: Appears well-developed and well-nourished  Appears in no acute distress   HEENT: Normocephalic and atraumatic  No scleral icterus  PERRLA  EOMI B/L  No oropharyngeal edema  MM moist    NECK: Neck supple with no lymphadenopathy  Trachea midline  No JVD  CARDIOVASCULAR: S1 and S2 are present  Regular rate and rhythm  No murmurs, rubs, or gallops  RESPIRATORY: CTA B/L, no rales, rhonci or wheezes  Normal respiratory expansion  ABDOMINAL: Bowel sounds present in all 4 quadrants, non-tender, soft, non-distended  No organomegaly, rebound, or guarding  EXTREMITIES: 2+ DP and PT pulses bilaterally; no cyanosis, clubbing, edema  ROM intact  GODFREY x4   MUSCULOSKELETAL: No joint tenderness, deformity or swelling, full range of motion without pain  NEUROLOGIC: Patient is alert and oriented to person, place, and time  No sensory or motor deficits  CN 2-12 intact  Plantars downgoing bilaterally  Speech fluent  SKIN: Skin is warm and dry  No skin lesions are present  No rashes  PSYCHIATRIC: Normal mood and affect     Lab Results: I have personally reviewed pertinent reports      CBC:   Results from last 7 days  Lab Units 10/05/18  1501   WBC Thousand/uL 7 63   RBC Million/uL 5 35   HEMOGLOBIN g/dL 16 4   HEMATOCRIT % 49 0   MCV fL 92   MCH pg 30 7   MCHC g/dL 33 5   RDW % 12 7   MPV fL 9 7   PLATELETS Thousands/uL 231   NRBC AUTO /100 WBCs 0   NEUTROS PCT % 68   LYMPHS PCT % 21   MONOS PCT % 7   EOS PCT % 3   BASOS PCT % 1   NEUTROS ABS Thousands/µL 5 23   LYMPHS ABS Thousands/µL 1 57   MONOS ABS Thousand/µL 0 50   EOS ABS Thousand/µL 0 26   , Chemistry Profile:   Results from last 7 days  Lab Units 10/05/18  1501   SODIUM mmol/L 140   POTASSIUM mmol/L 4 4   CHLORIDE mmol/L 105   CO2 mmol/L 30   BUN mg/dL 23   CREATININE mg/dL 1 36*   CALCIUM mg/dL 9 1   AST U/L 29   ALT U/L 50   ALK PHOS U/L 80   EGFR ml/min/1 73sq m 55   , Coagulation Studies:   , Cardiac Studies:   , Additional Labs:   Results from last 7 days  Lab Units 10/05/18  1501   MAGNESIUM mg/dL 2 5   , iSTAT CHEM 8:   Results from last 7 days  Lab Units 10/05/18  1501   ANION GAP mmol/L 5   EGFR ml/min/1 73sq m 55   HEMOGLOBIN g/dL 16 4   , ABG:   , Toxicology:   , Last A1C/Lipid Panel/Thyroid Panel: No results found for: HGBA1C, TRIG, CHOL, HDL, LDLCALC, [id number], T3FREE, T9MJYFU, FREET4    Imaging: I have personally reviewed pertinent films in PACS  Xr Chest 2 Views    Result Date: 10/5/2018  Narrative: CHEST INDICATION:   lightheadedness, pacemaker  COMPARISON:  Chest x-ray dated October 20, 2017  EXAM PERFORMED/VIEWS:  XR CHEST PA & LATERAL FINDINGS: Cardiomediastinal silhouette appears unremarkable  There is a stable left-sided cardiac pacer  The lungs are clear  No pneumothorax or pleural effusion  Age-appropriate degenerative changes are noted in the spine  Impression: No acute cardiopulmonary disease  Workstation performed: [id number]     Ct Head Without Contrast    Result Date: 10/5/2018  Narrative: CT BRAIN - WITHOUT CONTRAST INDICATION:   Dizziness  COMPARISON:  None  TECHNIQUE:  CT examination of the brain was performed  In addition to axial images, coronal 2D reformatted images were created and submitted for interpretation  Radiation dose length product (DLP) for this visit:  1173 27 mGy-cm   This examination, like all CT scans performed in the [facility], was performed utilizing techniques to minimize radiation dose exposure, including the use of iterative reconstruction and automated exposure control  IMAGE QUALITY:  Diagnostic  FINDINGS: PARENCHYMA:  No intracranial mass, mass effect or midline shift  No CT signs of acute infarction    No acute parenchymal hemorrhage  There are punctate hyperdense foci which appear somewhat linear in the right corona radiata  This is of uncertain etiology though more likely calcification or developmental venous anomalies than an acute bleed, though this cannot be entirely excluded  There is no surrounding edema  VENTRICLES AND EXTRA-AXIAL SPACES:  Normal for the patient's age  VISUALIZED ORBITS AND PARANASAL SINUSES:  No acute abnormality involving the orbits  Mild scattered sinus mucosal thickening is noted  No fluid levels are seen  CALVARIUM AND EXTRACRANIAL SOFT TISSUES:  Normal      Impression: Punctate hyperdense foci in the right corona radiata  There is no surrounding edema and this likely represents dystrophic calcification or possibly developmental venous anomalies  However, without prior imaging, small subarachnoid blood cannot be entirely excluded  I personally discussed this study with Francheska Fischer on 10/5/2018 at 3:41 PM  Workstation performed: [id number]       Microbiology: cultures obtained in emergency department include     Urinalysis:       Invalid input(s): URIBILINOGEN     Urine Micro:        EKG, Pathology, and Other Studies: I have personally reviewed pertinent reports  Medications given in Emergency Department       Assessment and Plan     Problem List     * (Principal)Intracranial bleed ([address] )    Hypertension      1  Weakness and numbness in left foot  -Patient does not have any neurological deficits, full sensation, full ROM and strength, no gait difficulty  -CT scan  Shows: Punctate hyperdense foci in the right corona radiata  There is no surrounding edema and this likely represents dystrophic calcification or possibly developmental venous anomalies  However, without prior imaging, small subarachnoid blood cannot be   entirely excluded    -Patient's brother was recently diagnosed with cancer, this may be a component of anxiety    Plan:  -CT head without contrast tomorrow to rule out intracranial bleeding      2  Hypertension  -continue home meds  -amlodipine 5mg  -losartan 75mg      3  Increased urinary frequency  -patient has a history of UTI      Plan: urinalysis             Code Status: Level 1 - Full Code  VTE Pharmacologic Prophylaxis: Sequential compression device (Venodyne)    VTE Mechanical Prophylaxis: sequential compression device  Admission Status: OBSERVATION    Admission Time  I spent 30 minutes admitting the patient  This involved direct patient contact where I performed a full history and physical, reviewing previous records, and reviewing laboratory and other diagnostic studies      Mele Atkinson, DO  Internal Medicine  PGY-1

## 2018-10-06 ENCOUNTER — HOSPITAL ENCOUNTER (OUTPATIENT)
Dept: RADIOLOGY | Facility: HOSPITAL | Age: 63
Discharge: HOME | End: 2018-10-06
Payer: COMMERCIAL

## 2018-10-06 PROCEDURE — 70450 CT HEAD/BRAIN W/O DYE: CPT

## 2018-10-06 NOTE — DISCHARGE INSTRUCTIONS
Get CT scan of the head tomorrow or on Monday as early as you can schedule it  Schedule a follow up appointment with your PCP, Dr Paola Linares as soon as possible as well  If you have worsening of your symptoms or new symptoms, return to the ER immediately

## 2018-10-06 NOTE — NURSING NOTE
Pt left P7, stable, via walking with family at side (refused wheelchair), IV removed, tolerated well  Pt provided with AVS and phone number for central staffing and educated on making an appt for CT scan asap  Pt verbalized understanding, all questions answered

## 2018-10-06 NOTE — DISCHARGE SUMMARY
[facility] Discharge Summary - Medical Maralyn Duane 58 y o  male MRN: [id number]    [address] [facility]  Room / Bed: [facility] 728/[facility] 283-51 Encounter: [id number]    BRIEF OVERVIEW    Admitting Provider: Yaima Rincon MD  Discharge Provider: Yaima Rincon MD  Primary Care Physician at Discharge: Karolina Gamez, [address] [place] [address]  Admission Date: 10/5/2018     Discharge Date: 10/5/2018 [address] Course  This is a 50-year-old male past medical history for hypertension, bradycardia status post pacemaker in 2017 who presented on 10/05/2018 with complaint of numbness on the plantar aspect of his foot for approximately 2 months  He also noted slight weakness of the left foot with increasing frequency over the last week  He notes the symptoms did not affect his gait or back months  Patient's brother had recently been diagnosed with brain tumor so the patient was concerned about the symptoms and came to the ED for evaluation  In the ED the patient was noted to have vitals within normal limits  A CT head without contrast demonstrated punctate hyperdense foci in the right corona radiata, likely representing calcification, but a small hemorrhage could not be ruled out  Patient was admitted for observation with serial neuro exams and follow-up 24 hour CT scan of the head  Patient was noted to have no focal deficits on neuro exam   Unfortunately, later in the evening the patient received news that his brother had passed away  He wished to leave the hospital in order to be with his family at home  The situation and the patient's case was discussed with the attending physician, Dr Manfred Carmen, who agreed the patient could be discharged home to have outpatient follow-up CT scan as well as close follow-up with his PCP  This was discussed with the patient and his family who agreed with the plan and understood the importance of getting a follow-up CT scan    He was discharged home on 10/05/18 with instructions for scheduling CT scan as well as following up with his PCP  He was also given explicit instructions to return to the ED if he had any worsening of his symptoms are new symptomatology  Presenting Problem/History of Present Illness  Principal Problem:    Intracranial bleed (HCC)  Active Problems:    Hypertension  Resolved Problems:    * No resolved hospital problems  *        Diagnostic Procedures Performed  Imaging Studies:  Xr Chest 2 Views    Result Date: 10/5/2018  Impression: No acute cardiopulmonary disease  Workstation performed: [id number]     Ct Head Without Contrast    Result Date: 10/5/2018  Impression: Punctate hyperdense foci in the right corona radiata  There is no surrounding edema and this likely represents dystrophic calcification or possibly developmental venous anomalies  However, without prior imaging, small subarachnoid blood cannot be entirely excluded  I personally discussed this study with Maximilian Valle on 10/5/2018 at 3:41 PM  Workstation performed: [id number]       Pertinent Labs:      Results from last 7 days  Lab Units 10/05/18  1501   SODIUM mmol/L 140   POTASSIUM mmol/L 4 4   CHLORIDE mmol/L 105   CO2 mmol/L 30   BUN mg/dL 23   CREATININE mg/dL 1 36*   CALCIUM mg/dL 9 1   ALK PHOS U/L 80   ALT U/L 50   AST U/L 29       Therapeutic Procedures Performed  none    Test Results Pending at Discharge:  Follow up CT scan needs to be performed as an outpatient    Medications     Medication List to be Continued at Discharge  Discharge Medication List as of 10/5/2018 11:12 PM      CONTINUE these medications which have NOT CHANGED    Details   amLODIPine (NORVASC) 5 mg tablet Take 5 mg by mouth daily, Historical Med      losartan (COZAAR) 100 MG tablet Take 75 mg by mouth, Historical Med           Discharge Medication List as of 10/5/2018 11:12 PM        Discharge Medication List as of 10/5/2018 11:12 PM          Allergies  Allergies   Allergen Reactions    Bactrim [Sulfamethoxazole-Trimethoprim] Hives     Discharge Diet: regular diet  Activity restrictions: none  Discharge Condition: good  Discharged With Lines: no    Discharge Disposition: Home/Self Care      Outpatient Follow-Up  Follow up with PCP within one week and schedule CT head within 1-2 days      Code Status: Level 1 - Full Code    Discharge  Statement   I spent 30 minutes minutes discharging the patient  This time was spent on the day of discharge  I had direct contact with the patient on the day of discharge  Additional documentation is required if more than 30 minutes were spent on discharge

## 2018-10-06 NOTE — PROGRESS NOTES
Pt stated his brother passed away this mack, and wanted to know his options abuot going to be with family, residents made aware and will be down to see pt

## 2018-10-08 ENCOUNTER — HOSPITAL ENCOUNTER (OUTPATIENT)
Dept: RADIOLOGY | Facility: HOSPITAL | Age: 63
Discharge: HOME | End: 2018-10-08

## 2018-10-08 DIAGNOSIS — I62.9 INTRACRANIAL BLEED (HCC): ICD-10-CM

## 2018-10-19 ENCOUNTER — REMOTE DEVICE CLINIC VISIT (OUTPATIENT)
Dept: CARDIOLOGY CLINIC | Facility: CLINIC | Age: 63
End: 2018-10-19
Payer: COMMERCIAL

## 2018-10-19 DIAGNOSIS — Z95.0 CARDIAC PACEMAKER IN SITU: ICD-10-CM

## 2018-10-19 DIAGNOSIS — I44.1 AV BLOCK, 2ND DEGREE: Primary | ICD-10-CM

## 2018-10-19 PROCEDURE — 93296 REM INTERROG EVL PM/IDS: CPT | Performed by: INTERNAL MEDICINE

## 2018-10-19 NOTE — PROGRESS NOTES
Results for orders placed or performed in visit on 10/19/18   Cardiac EP device report    Narrative    DR DANNY WAYNET DUAL PACEMAKER  CARELINK TRANSMISSION FOR DR DELGADO: BATTERY VOLTAGE ADEQUATE (8 5 YRS)  AP-43%, -100% (>40% AVB)  ALL AVAILABLE LEAD PARAMETERS WITHIN NORMAL LIMITS  NO SIGNIFICANT HIGH RATE EPISODES  NORMAL DEVICE FUNCTION   GV

## 2018-11-12 DIAGNOSIS — I10 HYPERTENSION, UNSPECIFIED TYPE: Primary | ICD-10-CM

## 2018-11-12 RX ORDER — LOSARTAN POTASSIUM 100 MG/1
TABLET ORAL
Qty: 90 TABLET | Refills: 3 | Status: SHIPPED | OUTPATIENT
Start: 2018-11-12 | End: 2019-10-30 | Stop reason: SDUPTHER

## 2018-11-12 RX ORDER — AMLODIPINE BESYLATE 5 MG/1
TABLET ORAL
Qty: 90 TABLET | Refills: 3 | Status: SHIPPED | OUTPATIENT
Start: 2018-11-12 | End: 2019-10-30 | Stop reason: SDUPTHER

## 2019-03-18 RX ORDER — TAMSULOSIN HYDROCHLORIDE 0.4 MG/1
0.4 CAPSULE ORAL
COMMUNITY
Start: 2016-01-22 | End: 2019-03-19

## 2019-03-19 ENCOUNTER — OFFICE VISIT (OUTPATIENT)
Dept: FAMILY MEDICINE CLINIC | Facility: CLINIC | Age: 64
End: 2019-03-19
Payer: COMMERCIAL

## 2019-03-19 VITALS
DIASTOLIC BLOOD PRESSURE: 80 MMHG | HEART RATE: 56 BPM | RESPIRATION RATE: 20 BRPM | BODY MASS INDEX: 30.12 KG/M2 | TEMPERATURE: 97.7 F | WEIGHT: 222.4 LBS | SYSTOLIC BLOOD PRESSURE: 130 MMHG | HEIGHT: 72 IN

## 2019-03-19 DIAGNOSIS — Z95.0 HISTORY OF PACEMAKER: ICD-10-CM

## 2019-03-19 DIAGNOSIS — I10 ESSENTIAL HYPERTENSION: ICD-10-CM

## 2019-03-19 DIAGNOSIS — Z28.21 VACCINATION REFUSED BY PATIENT: Primary | ICD-10-CM

## 2019-03-19 PROCEDURE — 3079F DIAST BP 80-89 MM HG: CPT | Performed by: FAMILY MEDICINE

## 2019-03-19 PROCEDURE — 99203 OFFICE O/P NEW LOW 30 MIN: CPT | Performed by: FAMILY MEDICINE

## 2019-03-19 PROCEDURE — 3075F SYST BP GE 130 - 139MM HG: CPT | Performed by: FAMILY MEDICINE

## 2019-03-20 ENCOUNTER — IN-CLINIC DEVICE VISIT (OUTPATIENT)
Dept: CARDIOLOGY CLINIC | Facility: CLINIC | Age: 64
End: 2019-03-20
Payer: COMMERCIAL

## 2019-03-20 DIAGNOSIS — I49.5 SSS (SICK SINUS SYNDROME) (HCC): Primary | ICD-10-CM

## 2019-03-20 DIAGNOSIS — I44.30 AVB (ATRIOVENTRICULAR BLOCK): ICD-10-CM

## 2019-03-20 DIAGNOSIS — Z95.0 PRESENCE OF CARDIAC PACEMAKER: ICD-10-CM

## 2019-03-20 PROCEDURE — 93280 PM DEVICE PROGR EVAL DUAL: CPT | Performed by: INTERNAL MEDICINE

## 2019-03-20 NOTE — PROGRESS NOTES
FAMILY PRACTICE OFFICE VISIT       NAME: Laurita Avila  AGE: 58 y o  SEX: male       : 1955        MRN: [id number]    DATE: 3/19/2019  TIME: 8:08 PM    Assessment and Plan     Problem List Items Addressed This Visit        Cardiovascular and Mediastinum    Hypertension     Hypertension  Patient blood pressure is stable at this time and he will continue with current regimen of medications  He will obtain blood work as ordered  Relevant Orders    CBC    Comprehensive metabolic panel    Lipid panel    TSH, 3rd generation    Ambulatory referral to Cardiology       Other    History of pacemaker     History of pacemaker  Patient was given referral to see his cardiologist Fannie Mei for yearly evaluation           Other Visit Diagnoses     Vaccination refused by patient    -  Primary    Relevant Orders    PREFERRED: influenza vaccine, [id number], quadrivalent, recombinant, PF, 0 5 mL, for patients 18 yr+ (FLUBLOK)            There are no Patient Instructions on file for this visit  Chief Complaint     Chief Complaint   Patient presents with    Establish Care       History of Present Illness     This is initial office visit for patient who is previous PCP Dr Eldridge retire 2018  Patient does have a history of pacemaker in sees his cardiologist approximately once a year  I reviewed his most recent blood test results from 2018  His colonoscopy was performed  and is up-to-date until the year   Review of Systems   Review of Systems   Constitutional: Negative  HENT: Negative  Respiratory: Negative  Cardiovascular: Negative  Gastrointestinal: Negative  Genitourinary: Negative  Musculoskeletal: Negative  Neurological: Negative  Psychiatric/Behavioral: Negative          Active Problem List     Patient Active Problem List   Diagnosis    Intracranial bleed ([address] )    Hypertension    History of pacemaker       Past Medical History:  Past Medical History: Diagnosis Date    Bradycardia        Past Surgical History:  Past Surgical History:   Procedure Laterality Date    CARDIAC PACEMAKER PLACEMENT  2017       Family History:  History reviewed  No pertinent family history  Social History:  Social History     Socioeconomic History    Marital status: /Civil Union     Spouse name: Not on file    Number of children: Not on file    Years of education: Not on file    Highest education level: Not on file   Occupational History    Not on file   Social Needs    Financial resource strain: Not on file    Food insecurity:     Worry: Not on file     Inability: Not on file    Transportation needs:     Medical: Not on file     Non-medical: Not on file   Tobacco Use    Smoking status: Current Some Day Smoker     Types: Cigars    Smokeless tobacco: Never Used    Tobacco comment: 1 a week   Substance and Sexual Activity    Alcohol use: No    Drug use: No    Sexual activity: Not on file   Lifestyle    Physical activity:     Days per week: Not on file     Minutes per session: Not on file    Stress: Not on file   Relationships    Social connections:     Talks on phone: Not on file     Gets together: Not on file     Attends [employer] service: Not on file     Active member of club or organization: Not on file     Attends meetings of clubs or organizations: Not on file     Relationship status: Not on file    Intimate partner violence:     Fear of current or ex partner: Not on file     Emotionally abused: Not on file     Physically abused: Not on file     Forced sexual activity: Not on file   Other Topics Concern    Not on file   Social History Narrative    Not on file       Objective     Vitals:    03/19/19 1753   BP: 130/80   Pulse:    Resp:    Temp:      Wt Readings from Last 3 Encounters:   03/19/19 101 kg (222 lb 6 4 oz)   10/05/18 97 1 kg (214 lb)   10/19/17 104 kg (230 lb)       Physical Exam   Constitutional: He is oriented to person, place, and time  No distress  HENT:   Right Ear: External ear normal    Left Ear: External ear normal    Mouth/Throat: Oropharynx is clear and moist  No oropharyngeal exudate  Tympanic membranes within normal limits bilaterally   Eyes: Pupils are equal, round, and reactive to light  Conjunctivae and EOM are normal  No scleral icterus  Neck:   No carotid bruits   Cardiovascular: Normal heart sounds  Regular rate and rhythm with no murmurs   Pulmonary/Chest: Breath sounds normal    Lungs are clear to auscultation without wheezes,rales, or rhonchi   Abdominal: Bowel sounds are normal    Abdomen is soft, nontender with positive bowel sounds  There is no rebound or guarding  No masses palpated   Musculoskeletal: He exhibits no edema  Lymphadenopathy:     He has no cervical adenopathy  Neurological: He is alert and oriented to person, place, and time  No cranial nerve deficit  Skin: No rash noted  Psychiatric: He has a normal mood and affect   His behavior is normal  Judgment and thought content normal        Pertinent Laboratory/Diagnostic Studies:  Lab Results   Component Value Date    BUN 23 10/05/2018    CREATININE 1 36 (H) 10/05/2018    CALCIUM 9 1 10/05/2018    K 4 4 10/05/2018    CO2 30 10/05/2018     10/05/2018     Lab Results   Component Value Date    ALT 50 10/05/2018    AST 29 10/05/2018    ALKPHOS 80 10/05/2018       Lab Results   Component Value Date    WBC 7 63 10/05/2018    HGB 16 4 10/05/2018    HCT 49 0 10/05/2018    MCV 92 10/05/2018     10/05/2018       No results found for: TSH    No results found for: CHOL  No results found for: TRIG  No results found for: HDL  No results found for: LDLCALC  No results found for: HGBA1C    Results for orders placed or performed during the hospital encounter of 10/05/18   CBC and differential   Result Value Ref Range    WBC 7 63 4 31 - 10 16 Thousand/uL    RBC 5 35 3 88 - 5 62 Million/uL    Hemoglobin 16 4 12 0 - 17 0 g/dL    Hematocrit 49 0 36 5 - 49 3 % MCV 92 82 - 98 fL    MCH 30 7 26 8 - 34 3 pg    MCHC 33 5 31 4 - 37 4 g/dL    RDW 12 7 11 6 - 15 1 %    MPV 9 7 8 9 - 12 7 fL    Platelets 902 971 - 977 Thousands/uL    nRBC 0 /100 WBCs    Neutrophils Relative 68 43 - 75 %    Immat GRANS % 0 0 - 2 %    Lymphocytes Relative 21 14 - 44 %    Monocytes Relative 7 4 - 12 %    Eosinophils Relative 3 0 - 6 %    Basophils Relative 1 0 - 1 %    Neutrophils Absolute 5 23 1 85 - 7 62 Thousands/µL    Immature Grans Absolute 0 02 0 00 - 0 20 Thousand/uL    Lymphocytes Absolute 1 57 0 60 - 4 47 Thousands/µL    Monocytes Absolute 0 50 0 17 - 1 22 Thousand/µL    Eosinophils Absolute 0 26 0 00 - 0 61 Thousand/µL    Basophils Absolute 0 05 0 00 - 0 10 Thousands/µL   Comprehensive metabolic panel   Result Value Ref Range    Sodium 140 136 - 145 mmol/L    Potassium 4 4 3 5 - 5 3 mmol/L    Chloride 105 100 - 108 mmol/L    CO2 30 21 - 32 mmol/L    ANION GAP 5 4 - 13 mmol/L    BUN 23 5 - 25 mg/dL    Creatinine 1 36 (H) 0 60 - 1 30 mg/dL    Glucose 97 65 - 140 mg/dL    Calcium 9 1 8 3 - 10 1 mg/dL    AST 29 5 - 45 U/L    ALT 50 12 - 78 U/L    Alkaline Phosphatase 80 46 - 116 U/L    Total Protein 8 2 6 4 - 8 2 g/dL    Albumin 3 8 3 5 - 5 0 g/dL    Total Bilirubin 0 86 0 20 - 1 00 mg/dL    eGFR 55 ml/min/1 73sq m   Magnesium   Result Value Ref Range    Magnesium 2 5 1 6 - 2 6 mg/dL   Urinalysis with microscopic   Result Value Ref Range    Clarity, UA Cloudy     Color, UA Yellow     Specific [place], UA 1 005 1 003 - 1 030    pH, UA 5 5 4 5 - 8 0    Glucose, UA Negative Negative mg/dl    Ketones, UA Negative Negative mg/dl    Blood, UA Negative Negative    Protein, UA Negative Negative mg/dl    Nitrite, UA Negative Negative    Bilirubin, UA Negative Negative    Urobilinogen, UA 0 2 0 2, 1 0 E U /dl E U /dl    Leukocytes, UA Small (A) Negative    WBC, UA None Seen None Seen, 0-5, 5-55, 5-65 /hpf    RBC, UA None Seen None Seen, 0-5 /hpf    Bacteria, UA None Seen None Seen, Occasional /hpf Epithelial Cells None Seen None Seen, Occasional /hpf   ECG 12 lead   Result Value Ref Range    Ventricular Rate 59 BPM    Atrial Rate 59 BPM    NM Interval 184 ms    QRSD Interval 122 ms    QT Interval 416 ms    QTC Interval 411 ms    P Axis 91 degrees    QRS Axis -67 degrees    T Wave Axis 114 degrees       Orders Placed This Encounter   Procedures    PREFERRED: influenza vaccine, [id number], quadrivalent, recombinant, PF, 0 5 mL, for patients 18 yr+ (FLUBLOK)    CBC    Comprehensive metabolic panel    Lipid panel    TSH, 3rd generation    Ambulatory referral to Cardiology       ALLERGIES:  Allergies   Allergen Reactions    Bactrim [Sulfamethoxazole-Trimethoprim] Hives       Current Medications     Current Outpatient Medications   Medication Sig Dispense Refill    amLODIPine (NORVASC) 5 mg tablet TAKE 1 TABLET BY MOUTH DAILY 90 tablet 3    losartan (COZAAR) 100 MG tablet TAKE 1 TABLET BY MOUTH DAILY 90 tablet 3     No current facility-administered medications for this visit  Health Maintenance     Health Maintenance   Topic Date Due    Hepatitis C Screening  1955    BMI: Followup Plan  07/02/1972    Pneumococcal PPSV23 Medium Risk Adult (1 of 1 - PPSV23) 07/02/1973    DTaP,Tdap,and Td Vaccines (1 - Tdap) 07/02/1975    INFLUENZA VACCINE  03/19/2020 (Originally 7/1/2018)    BMI: Adult  10/05/2019    Depression Screening PHQ  03/19/2020    CRC Screening: Colonoscopy  12/17/2023    HEPATITIS B VACCINES  Aged Out     There is no immunization history for the selected administration types on file for this patient      Manuela Lui MD

## 2019-03-20 NOTE — ASSESSMENT & PLAN NOTE
Hypertension  Patient blood pressure is stable at this time and he will continue with current regimen of medications  He will obtain blood work as ordered

## 2019-03-20 NOTE — ASSESSMENT & PLAN NOTE
History of pacemaker    Patient was given referral to see his cardiologist Azalia Pearson for yearly evaluation

## 2019-03-20 NOTE — PROGRESS NOTES
Results for orders placed or performed in visit on 03/20/19   Cardiac EP device report    Narrative    DR DANNY NEGRON DUAL PACEMAKER  DEVICE INTERROGATED IN THE [place] OFFICE  BATTERY VOLTAGE ADEQUATE (8 YRS)  AP: 36 2%  : 86 9% (MVP-ON)  ALL LEAD PARAMETERS WITHIN NORMAL LIMITS  1 VT MONITORED EPISODE W/ EGRAM SHOWING NSVT 6 BEATS @ 182 BPM   4 AT/AF MONITORED EPISODES W/MARKERS/EGRAMS SHOWING AT & COMP  ATRIAL PACING, MAX DURATION 3 MINS  PT DOES NOT TAKE BB OR AC  NO PROGRAMMING CHANGES MADE TO DEVICE PARAMETERS  PACEMAKER FUNCTIONING APPROPRIATELY   [address] [place] [address]

## 2019-04-04 ENCOUNTER — OFFICE VISIT (OUTPATIENT)
Dept: CARDIOLOGY CLINIC | Facility: CLINIC | Age: 64
End: 2019-04-04
Payer: COMMERCIAL

## 2019-04-04 VITALS
HEIGHT: 72 IN | BODY MASS INDEX: 30.23 KG/M2 | SYSTOLIC BLOOD PRESSURE: 140 MMHG | RESPIRATION RATE: 18 BRPM | DIASTOLIC BLOOD PRESSURE: 90 MMHG | HEART RATE: 70 BPM | WEIGHT: 223.2 LBS

## 2019-04-04 DIAGNOSIS — I10 ESSENTIAL HYPERTENSION: ICD-10-CM

## 2019-04-04 DIAGNOSIS — I45.9 HEART BLOCK: Primary | ICD-10-CM

## 2019-04-04 DIAGNOSIS — I47.1 SVT (SUPRAVENTRICULAR TACHYCARDIA) (HCC): ICD-10-CM

## 2019-04-04 PROCEDURE — 93000 ELECTROCARDIOGRAM COMPLETE: CPT | Performed by: INTERNAL MEDICINE

## 2019-04-04 PROCEDURE — 99214 OFFICE O/P EST MOD 30 MIN: CPT | Performed by: INTERNAL MEDICINE

## 2019-04-04 RX ORDER — METOPROLOL SUCCINATE 50 MG/1
50 TABLET, EXTENDED RELEASE ORAL DAILY
Qty: 30 TABLET | Refills: 6 | Status: SHIPPED | OUTPATIENT
Start: 2019-04-04 | End: 2019-10-30 | Stop reason: SDUPTHER

## 2019-06-28 ENCOUNTER — REMOTE DEVICE CLINIC VISIT (OUTPATIENT)
Dept: CARDIOLOGY CLINIC | Facility: CLINIC | Age: 64
End: 2019-06-28
Payer: COMMERCIAL

## 2019-06-28 DIAGNOSIS — Z95.0 CARDIAC PACEMAKER IN SITU: Primary | ICD-10-CM

## 2019-06-28 PROCEDURE — 93294 REM INTERROG EVL PM/LDLS PM: CPT | Performed by: INTERNAL MEDICINE

## 2019-06-28 PROCEDURE — 93296 REM INTERROG EVL PM/IDS: CPT | Performed by: INTERNAL MEDICINE

## 2019-07-01 NOTE — PROGRESS NOTES
Results for orders placed or performed in visit on 06/28/19   Cardiac EP device report    Narrative    DR DANNY WAYNET DUAL PACEMAKER  CARELINK TRANSMISSION: BATTERY VOLTAGE ADEQUATE (8 YRS)  AP-61%, -0 8%  ALL AVAILABLE LEAD PARAMETERS WITHIN NORMAL LIMITS  NO SIGNIFICANT HIGH RATE EPISODES  NORMAL DEVICE FUNCTION   GV

## 2019-07-09 ENCOUNTER — TELEPHONE (OUTPATIENT)
Dept: FAMILY MEDICINE CLINIC | Facility: CLINIC | Age: 64
End: 2019-07-09

## 2019-08-26 ENCOUNTER — OFFICE VISIT (OUTPATIENT)
Dept: CARDIOLOGY CLINIC | Facility: CLINIC | Age: 64
End: 2019-08-26
Payer: COMMERCIAL

## 2019-08-26 VITALS
SYSTOLIC BLOOD PRESSURE: 135 MMHG | RESPIRATION RATE: 18 BRPM | HEIGHT: 72 IN | BODY MASS INDEX: 29.99 KG/M2 | HEART RATE: 70 BPM | DIASTOLIC BLOOD PRESSURE: 85 MMHG | WEIGHT: 221.4 LBS

## 2019-08-26 DIAGNOSIS — I10 ESSENTIAL HYPERTENSION: Primary | ICD-10-CM

## 2019-08-26 DIAGNOSIS — I47.1 SVT (SUPRAVENTRICULAR TACHYCARDIA) (HCC): ICD-10-CM

## 2019-08-26 DIAGNOSIS — I45.9 HEART BLOCK: ICD-10-CM

## 2019-08-26 PROCEDURE — 99213 OFFICE O/P EST LOW 20 MIN: CPT | Performed by: INTERNAL MEDICINE

## 2019-08-26 PROCEDURE — 3075F SYST BP GE 130 - 139MM HG: CPT | Performed by: INTERNAL MEDICINE

## 2019-08-26 NOTE — LETTER
August 26, 9158     Wojciech Gaona MD  [address] [facility] [address]    Patient: Kennedy Zendejas   YOB: 1955   Date of Visit: 8/26/2019       Dear Dr Lebron Dus: Thank you for referring Doron Falk to me for evaluation  Below are my notes for this consultation  If you have questions, please do not hesitate to call me  I look forward to following your patient along with you  Sincerely,        Raffi Henley MD        CC: No Recipients  Raffi Henley MD  8/26/2019  3:54 PM  Sign at close encounter  Assessment/Plan:    Hypertension  Hypertension, stable and adequately controlled  Blood pressure measurements at home are lower than his noted here in the office  Heart block  History of exercise-induced heart block status post permanent pacemaker  The patient is asymptomatic  SVT (supraventricular tachycardia) (HCC)  History of supraventricular tachycardia, stable  The patient is asymptomatic we will continue metoprolol succinate ER at 50 mg daily  Diagnoses and all orders for this visit:    Essential hypertension    Heart block    SVT (supraventricular tachycardia) (HCC)          Subjective:  Feels rather well  Patient ID: Kennedy Zendejas is a 71 y o  male  The patient presented to this office for the purpose of cardiac follow-up  He has a history of heart block status post permanent pacemaker  He also has a history of hypertension and episodes of supraventricular tachycardia  He has been feeling rather well denying any symptoms of chest pain, shortness of breath, palpitation, dizziness or lightheadedness  He has no significant leg edema        The following portions of the patient's history were reviewed and updated as appropriate: allergies, current medications, past family history, past medical history, past social history, past surgical history and problem list     Review of Systems   Respiratory: Negative for apnea, cough, chest tightness, shortness of breath and wheezing  Cardiovascular: Negative for chest pain, palpitations and leg swelling  Gastrointestinal: Negative for abdominal pain  Neurological: Negative for dizziness and light-headedness  Objective:  Stable cardiac-wise  /85 (BP Location: Left arm, Patient Position: Sitting)   Pulse 70   Resp 18   Ht 6' (1 829 m)   Wt 100 kg (221 lb 6 4 oz)   BMI 30 03 kg/m²           Physical Exam   Constitutional: He is oriented to person, place, and time  He appears well-developed and well-nourished  No distress  HENT:   Head: Normocephalic  Eyes: Pupils are equal, round, and reactive to light  Neck: Normal range of motion  No JVD present  No thyromegaly present  Cardiovascular: Normal rate, regular rhythm, S1 normal and S2 normal  Exam reveals no gallop and no friction rub  Murmur heard  Crescendo systolic murmur is present with a grade of 1/6  Pulmonary/Chest: Effort normal and breath sounds normal  No respiratory distress  He has no wheezes  He has no rales  He exhibits no tenderness  Abdominal: Soft  Musculoskeletal: Normal range of motion  He exhibits no edema, tenderness or deformity  Neurological: He is alert and oriented to person, place, and time  Skin: Skin is warm and dry  He is not diaphoretic  Psychiatric: He has a normal mood and affect  Vitals reviewed

## 2019-08-26 NOTE — ASSESSMENT & PLAN NOTE
History of supraventricular tachycardia, stable  The patient is asymptomatic we will continue metoprolol succinate ER at 50 mg daily

## 2019-08-26 NOTE — ASSESSMENT & PLAN NOTE
History of exercise-induced heart block status post permanent pacemaker  The patient is asymptomatic

## 2019-08-26 NOTE — ASSESSMENT & PLAN NOTE
Hypertension, stable and adequately controlled  Blood pressure measurements at home are lower than his noted here in the office

## 2019-08-26 NOTE — PROGRESS NOTES
Assessment/Plan:    Hypertension  Hypertension, stable and adequately controlled  Blood pressure measurements at home are lower than his noted here in the office  Heart block  History of exercise-induced heart block status post permanent pacemaker  The patient is asymptomatic  SVT (supraventricular tachycardia) (HCC)  History of supraventricular tachycardia, stable  The patient is asymptomatic we will continue metoprolol succinate ER at 50 mg daily  Diagnoses and all orders for this visit:    Essential hypertension    Heart block    SVT (supraventricular tachycardia) (HCC)          Subjective:  Feels rather well  Patient ID: Shelton Smith is a 71 y o  male  The patient presented to this office for the purpose of cardiac follow-up  He has a history of heart block status post permanent pacemaker  He also has a history of hypertension and episodes of supraventricular tachycardia  He has been feeling rather well denying any symptoms of chest pain, shortness of breath, palpitation, dizziness or lightheadedness  He has no significant leg edema  The following portions of the patient's history were reviewed and updated as appropriate: allergies, current medications, past family history, past medical history, past social history, past surgical history and problem list     Review of Systems   Respiratory: Negative for apnea, cough, chest tightness, shortness of breath and wheezing  Cardiovascular: Negative for chest pain, palpitations and leg swelling  Gastrointestinal: Negative for abdominal pain  Neurological: Negative for dizziness and light-headedness  Objective:  Stable cardiac-wise  /85 (BP Location: Left arm, Patient Position: Sitting)   Pulse 70   Resp 18   Ht 6' (1 829 m)   Wt 100 kg (221 lb 6 4 oz)   BMI 30 03 kg/m²          Physical Exam   Constitutional: He is oriented to person, place, and time  He appears well-developed and well-nourished   No distress  HENT:   Head: Normocephalic  Eyes: Pupils are equal, round, and reactive to light  Neck: Normal range of motion  No JVD present  No thyromegaly present  Cardiovascular: Normal rate, regular rhythm, S1 normal and S2 normal  Exam reveals no gallop and no friction rub  Murmur heard  Crescendo systolic murmur is present with a grade of 1/6  Pulmonary/Chest: Effort normal and breath sounds normal  No respiratory distress  He has no wheezes  He has no rales  He exhibits no tenderness  Abdominal: Soft  Musculoskeletal: Normal range of motion  He exhibits no edema, tenderness or deformity  Neurological: He is alert and oriented to person, place, and time  Skin: Skin is warm and dry  He is not diaphoretic  Psychiatric: He has a normal mood and affect  Vitals reviewed

## 2019-09-27 ENCOUNTER — REMOTE DEVICE CLINIC VISIT (OUTPATIENT)
Dept: CARDIOLOGY CLINIC | Facility: CLINIC | Age: 64
End: 2019-09-27
Payer: COMMERCIAL

## 2019-09-27 DIAGNOSIS — Z95.0 PACEMAKER: Primary | ICD-10-CM

## 2019-09-27 PROCEDURE — 93294 REM INTERROG EVL PM/LDLS PM: CPT | Performed by: INTERNAL MEDICINE

## 2019-09-27 PROCEDURE — 93296 REM INTERROG EVL PM/IDS: CPT | Performed by: INTERNAL MEDICINE

## 2019-09-27 NOTE — PROGRESS NOTES
Results for orders placed or performed in visit on 09/27/19   Cardiac EP device report    Narrative    DR DANNY WAYNET-DUAL PACEMAKER (AAI-DDD MODE)  CARELINK TRANSMISSION: BATTERY ADEQUATE (8 YRS)  AP 63%;  0%  ALL LEAD PARAMETERS WITHIN NORMAL LIMITS  3 VHR EPISODES WITH E-GRAMS SHOWING SVT ON EPISODES #7 & 8 (UP  BPM & 4 SECONDS) & NSVT ON EPISODE #9 (UP  BPM & 6 BEATS)  PT  TAKES METOPROLOL SUCC  NORMAL DEVICE FUNCTION   PL

## 2019-10-30 DIAGNOSIS — I10 HYPERTENSION, UNSPECIFIED TYPE: ICD-10-CM

## 2019-10-30 DIAGNOSIS — I47.1 SVT (SUPRAVENTRICULAR TACHYCARDIA) (HCC): ICD-10-CM

## 2019-10-31 RX ORDER — METOPROLOL SUCCINATE 50 MG/1
TABLET, EXTENDED RELEASE ORAL
Qty: 30 TABLET | Refills: 6 | Status: SHIPPED | OUTPATIENT
Start: 2019-10-31 | End: 2020-05-27

## 2019-10-31 RX ORDER — AMLODIPINE BESYLATE 5 MG/1
TABLET ORAL
Qty: 90 TABLET | Refills: 3 | Status: SHIPPED | OUTPATIENT
Start: 2019-10-31 | End: 2020-10-24

## 2019-10-31 RX ORDER — LOSARTAN POTASSIUM 100 MG/1
TABLET ORAL
Qty: 90 TABLET | Refills: 3 | Status: SHIPPED | OUTPATIENT
Start: 2019-10-31 | End: 2020-10-24

## 2019-11-21 ENCOUNTER — OFFICE VISIT (OUTPATIENT)
Dept: FAMILY MEDICINE CLINIC | Facility: CLINIC | Age: 64
End: 2019-11-21
Payer: COMMERCIAL

## 2019-11-21 ENCOUNTER — APPOINTMENT (EMERGENCY)
Dept: RADIOLOGY | Facility: HOSPITAL | Age: 64
DRG: 683 | End: 2019-11-21
Payer: COMMERCIAL

## 2019-11-21 ENCOUNTER — HOSPITAL ENCOUNTER (INPATIENT)
Facility: HOSPITAL | Age: 64
LOS: 1 days | Discharge: HOME | DRG: 683 | End: 2019-11-22
Attending: EMERGENCY MEDICINE | Admitting: HOSPITALIST
Payer: COMMERCIAL

## 2019-11-21 VITALS
SYSTOLIC BLOOD PRESSURE: 130 MMHG | RESPIRATION RATE: 16 BRPM | BODY MASS INDEX: 30.51 KG/M2 | DIASTOLIC BLOOD PRESSURE: 88 MMHG | TEMPERATURE: 97.9 F | HEART RATE: 80 BPM | WEIGHT: 225.25 LBS | HEIGHT: 72 IN | OXYGEN SATURATION: 93 %

## 2019-11-21 DIAGNOSIS — R10.31 RIGHT LOWER QUADRANT ABDOMINAL PAIN: ICD-10-CM

## 2019-11-21 DIAGNOSIS — N18.30 ACUTE RENAL FAILURE SUPERIMPOSED ON STAGE 3 CHRONIC KIDNEY DISEASE (HCC): ICD-10-CM

## 2019-11-21 DIAGNOSIS — R11.0 MILD NAUSEA: ICD-10-CM

## 2019-11-21 DIAGNOSIS — R10.9 ABDOMINAL PAIN: Primary | ICD-10-CM

## 2019-11-21 DIAGNOSIS — N20.0 KIDNEY STONE: ICD-10-CM

## 2019-11-21 DIAGNOSIS — R10.30 LOWER ABDOMINAL PAIN: Primary | ICD-10-CM

## 2019-11-21 DIAGNOSIS — N17.9 ACUTE RENAL FAILURE SUPERIMPOSED ON STAGE 3 CHRONIC KIDNEY DISEASE (HCC): ICD-10-CM

## 2019-11-21 PROBLEM — Z95.0 HISTORY OF PACEMAKER: Chronic | Status: ACTIVE | Noted: 2019-03-19

## 2019-11-21 PROBLEM — E66.9 OBESITY (BMI 30-39.9): Status: ACTIVE | Noted: 2019-11-21

## 2019-11-21 PROBLEM — E87.1 HYPONATREMIA: Status: ACTIVE | Noted: 2019-11-21

## 2019-11-21 PROBLEM — I16.0 HYPERTENSIVE URGENCY: Status: ACTIVE | Noted: 2019-11-21

## 2019-11-21 PROBLEM — E66.9 OBESITY (BMI 30-39.9): Chronic | Status: ACTIVE | Noted: 2019-11-21

## 2019-11-21 PROBLEM — R17 TOTAL BILIRUBIN, ELEVATED: Status: ACTIVE | Noted: 2019-11-21

## 2019-11-21 PROBLEM — R93.5 ABNORMAL CT OF THE ABDOMEN: Status: ACTIVE | Noted: 2019-11-21

## 2019-11-21 LAB
ALBUMIN SERPL BCP-MCNC: 3.4 G/DL (ref 3.5–5)
ALP SERPL-CCNC: 68 U/L (ref 46–116)
ALT SERPL W P-5'-P-CCNC: 33 U/L (ref 12–78)
ANION GAP SERPL CALCULATED.3IONS-SCNC: 5 MMOL/L (ref 4–13)
AST SERPL W P-5'-P-CCNC: 24 U/L (ref 5–45)
ATRIAL RATE: 68 BPM
ATRIAL RATE: 75 BPM
ATRIAL RATE: 79 BPM
BACTERIA UR QL AUTO: ABNORMAL /HPF
BASOPHILS # BLD AUTO: 0.05 THOUSANDS/ΜL (ref 0–0.1)
BASOPHILS NFR BLD AUTO: 0 % (ref 0–1)
BILIRUB SERPL-MCNC: 1.84 MG/DL (ref 0.2–1)
BILIRUB UR QL STRIP: NEGATIVE
BUN SERPL-MCNC: 30 MG/DL (ref 5–25)
CALCIUM SERPL-MCNC: 9.2 MG/DL (ref 8.3–10.1)
CHLORIDE SERPL-SCNC: 105 MMOL/L (ref 100–108)
CLARITY UR: ABNORMAL
CO2 SERPL-SCNC: 25 MMOL/L (ref 21–32)
COLOR UR: ABNORMAL
COLOR, POC: NORMAL
CREAT SERPL-MCNC: 2.55 MG/DL (ref 0.6–1.3)
EOSINOPHIL # BLD AUTO: 0.03 THOUSAND/ΜL (ref 0–0.61)
EOSINOPHIL NFR BLD AUTO: 0 % (ref 0–6)
ERYTHROCYTE [DISTWIDTH] IN BLOOD BY AUTOMATED COUNT: 12.8 % (ref 11.6–15.1)
GFR SERPL CREATININE-BSD FRML MDRD: 25 ML/MIN/1.73SQ M
GLUCOSE SERPL-MCNC: 105 MG/DL (ref 65–140)
GLUCOSE UR STRIP-MCNC: NEGATIVE MG/DL
HCT VFR BLD AUTO: 50.5 % (ref 36.5–49.3)
HGB BLD-MCNC: 16.8 G/DL (ref 12–17)
HGB UR QL STRIP.AUTO: ABNORMAL
IMM GRANULOCYTES # BLD AUTO: 0.05 THOUSAND/UL (ref 0–0.2)
IMM GRANULOCYTES NFR BLD AUTO: 0 % (ref 0–2)
KETONES UR STRIP-MCNC: NEGATIVE MG/DL
LEUKOCYTE ESTERASE UR QL STRIP: ABNORMAL
LIPASE SERPL-CCNC: 86 U/L (ref 73–393)
LYMPHOCYTES # BLD AUTO: 1.19 THOUSANDS/ΜL (ref 0.6–4.47)
LYMPHOCYTES NFR BLD AUTO: 9 % (ref 14–44)
MCH RBC QN AUTO: 30.5 PG (ref 26.8–34.3)
MCHC RBC AUTO-ENTMCNC: 33.3 G/DL (ref 31.4–37.4)
MCV RBC AUTO: 92 FL (ref 82–98)
MONOCYTES # BLD AUTO: 1.51 THOUSAND/ΜL (ref 0.17–1.22)
MONOCYTES NFR BLD AUTO: 11 % (ref 4–12)
NEUTROPHILS # BLD AUTO: 11.23 THOUSANDS/ΜL (ref 1.85–7.62)
NEUTS SEG NFR BLD AUTO: 80 % (ref 43–75)
NITRITE UR QL STRIP: NEGATIVE
NON-SQ EPI CELLS URNS QL MICRO: ABNORMAL /HPF
NRBC BLD AUTO-RTO: 0 /100 WBCS
P AXIS: 38 DEGREES
P AXIS: 44 DEGREES
P AXIS: 60 DEGREES
PH UR STRIP.AUTO: 5.5 [PH] (ref 4.5–8)
PLATELET # BLD AUTO: 203 THOUSANDS/UL (ref 149–390)
PMV BLD AUTO: 9.4 FL (ref 8.9–12.7)
POTASSIUM SERPL-SCNC: 4.4 MMOL/L (ref 3.5–5.3)
PR INTERVAL: 174 MS
PR INTERVAL: 174 MS
PR INTERVAL: 176 MS
PROT SERPL-MCNC: 7.8 G/DL (ref 6.4–8.2)
PROT UR STRIP-MCNC: ABNORMAL MG/DL
QRS AXIS: -6 DEGREES
QRS AXIS: 1 DEGREES
QRS AXIS: 7 DEGREES
QRSD INTERVAL: 112 MS
QRSD INTERVAL: 114 MS
QRSD INTERVAL: 116 MS
QT INTERVAL: 386 MS
QT INTERVAL: 390 MS
QT INTERVAL: 396 MS
QTC INTERVAL: 421 MS
QTC INTERVAL: 431 MS
QTC INTERVAL: 447 MS
RBC # BLD AUTO: 5.51 MILLION/UL (ref 3.88–5.62)
RBC #/AREA URNS AUTO: ABNORMAL /HPF
SODIUM SERPL-SCNC: 135 MMOL/L (ref 136–145)
SP GR UR STRIP.AUTO: 1.01 (ref 1–1.03)
T WAVE AXIS: -14 DEGREES
T WAVE AXIS: -24 DEGREES
T WAVE AXIS: -9 DEGREES
TROPONIN I SERPL-MCNC: <0.02 NG/ML
UROBILINOGEN UR QL STRIP.AUTO: 0.2 E.U./DL
VENTRICULAR RATE: 68 BPM
VENTRICULAR RATE: 75 BPM
VENTRICULAR RATE: 79 BPM
WBC # BLD AUTO: 14.06 THOUSAND/UL (ref 4.31–10.16)
WBC #/AREA URNS AUTO: ABNORMAL /HPF

## 2019-11-21 PROCEDURE — 74176 CT ABD & PELVIS W/O CONTRAST: CPT

## 2019-11-21 PROCEDURE — 80053 COMPREHEN METABOLIC PANEL: CPT | Performed by: EMERGENCY MEDICINE

## 2019-11-21 PROCEDURE — 99285 EMERGENCY DEPT VISIT HI MDM: CPT | Performed by: EMERGENCY MEDICINE

## 2019-11-21 PROCEDURE — 83690 ASSAY OF LIPASE: CPT | Performed by: EMERGENCY MEDICINE

## 2019-11-21 PROCEDURE — 96376 TX/PRO/DX INJ SAME DRUG ADON: CPT

## 2019-11-21 PROCEDURE — 99285 EMERGENCY DEPT VISIT HI MDM: CPT

## 2019-11-21 PROCEDURE — 81001 URINALYSIS AUTO W/SCOPE: CPT

## 2019-11-21 PROCEDURE — 84484 ASSAY OF TROPONIN QUANT: CPT | Performed by: EMERGENCY MEDICINE

## 2019-11-21 PROCEDURE — 96361 HYDRATE IV INFUSION ADD-ON: CPT

## 2019-11-21 PROCEDURE — 1101F PT FALLS ASSESS-DOCD LE1/YR: CPT | Performed by: FAMILY MEDICINE

## 2019-11-21 PROCEDURE — 99213 OFFICE O/P EST LOW 20 MIN: CPT | Performed by: FAMILY MEDICINE

## 2019-11-21 PROCEDURE — 96374 THER/PROPH/DIAG INJ IV PUSH: CPT

## 2019-11-21 PROCEDURE — 93010 ELECTROCARDIOGRAM REPORT: CPT | Performed by: INTERNAL MEDICINE

## 2019-11-21 PROCEDURE — 1111F DSCHRG MED/CURRENT MED MERGE: CPT | Performed by: FAMILY MEDICINE

## 2019-11-21 PROCEDURE — 36415 COLL VENOUS BLD VENIPUNCTURE: CPT | Performed by: EMERGENCY MEDICINE

## 2019-11-21 PROCEDURE — 93005 ELECTROCARDIOGRAM TRACING: CPT

## 2019-11-21 PROCEDURE — 85025 COMPLETE CBC W/AUTO DIFF WBC: CPT | Performed by: EMERGENCY MEDICINE

## 2019-11-21 PROCEDURE — 99223 1ST HOSP IP/OBS HIGH 75: CPT | Performed by: HOSPITALIST

## 2019-11-21 RX ORDER — POLYETHYLENE GLYCOL 3350 17 G/17G
17 POWDER, FOR SOLUTION ORAL ONCE
Status: COMPLETED | OUTPATIENT
Start: 2019-11-21 | End: 2019-11-21

## 2019-11-21 RX ORDER — HEPARIN SODIUM 5000 [USP'U]/ML
5000 INJECTION, SOLUTION INTRAVENOUS; SUBCUTANEOUS EVERY 8 HOURS SCHEDULED
Status: DISCONTINUED | OUTPATIENT
Start: 2019-11-21 | End: 2019-11-22 | Stop reason: HOSPADM

## 2019-11-21 RX ORDER — SENNOSIDES 8.6 MG
1 TABLET ORAL DAILY
Status: DISCONTINUED | OUTPATIENT
Start: 2019-11-22 | End: 2019-11-22 | Stop reason: HOSPADM

## 2019-11-21 RX ORDER — MORPHINE SULFATE 15 MG/1
30 TABLET ORAL EVERY 6 HOURS PRN
Status: DISCONTINUED | OUTPATIENT
Start: 2019-11-21 | End: 2019-11-22 | Stop reason: HOSPADM

## 2019-11-21 RX ORDER — HYDROMORPHONE HCL/PF 1 MG/ML
0.5 SYRINGE (ML) INJECTION ONCE
Status: COMPLETED | OUTPATIENT
Start: 2019-11-21 | End: 2019-11-21

## 2019-11-21 RX ORDER — MORPHINE SULFATE 15 MG/1
15 TABLET ORAL EVERY 4 HOURS PRN
Status: DISCONTINUED | OUTPATIENT
Start: 2019-11-21 | End: 2019-11-22 | Stop reason: HOSPADM

## 2019-11-21 RX ORDER — ONDANSETRON 2 MG/ML
4 INJECTION INTRAMUSCULAR; INTRAVENOUS EVERY 6 HOURS PRN
Status: DISCONTINUED | OUTPATIENT
Start: 2019-11-21 | End: 2019-11-22 | Stop reason: HOSPADM

## 2019-11-21 RX ORDER — METOPROLOL SUCCINATE 50 MG/1
50 TABLET, EXTENDED RELEASE ORAL DAILY
Status: DISCONTINUED | OUTPATIENT
Start: 2019-11-22 | End: 2019-11-22 | Stop reason: HOSPADM

## 2019-11-21 RX ORDER — ACETAMINOPHEN 325 MG/1
650 TABLET ORAL EVERY 6 HOURS PRN
Status: DISCONTINUED | OUTPATIENT
Start: 2019-11-21 | End: 2019-11-22 | Stop reason: HOSPADM

## 2019-11-21 RX ORDER — POLYETHYLENE GLYCOL 3350 17 G/17G
17 POWDER, FOR SOLUTION ORAL DAILY PRN
Status: DISCONTINUED | OUTPATIENT
Start: 2019-11-21 | End: 2019-11-22 | Stop reason: HOSPADM

## 2019-11-21 RX ORDER — SODIUM CHLORIDE 9 MG/ML
90 INJECTION, SOLUTION INTRAVENOUS CONTINUOUS
Status: DISPENSED | OUTPATIENT
Start: 2019-11-21 | End: 2019-11-22

## 2019-11-21 RX ORDER — HYDROMORPHONE HCL/PF 1 MG/ML
0.5 SYRINGE (ML) INJECTION EVERY 4 HOURS PRN
Status: ACTIVE | OUTPATIENT
Start: 2019-11-21 | End: 2019-11-22

## 2019-11-21 RX ORDER — NICOTINE 21 MG/24HR
1 PATCH, TRANSDERMAL 24 HOURS TRANSDERMAL DAILY
Status: DISCONTINUED | OUTPATIENT
Start: 2019-11-22 | End: 2019-11-22

## 2019-11-21 RX ORDER — AMLODIPINE BESYLATE 5 MG/1
5 TABLET ORAL DAILY
Status: DISCONTINUED | OUTPATIENT
Start: 2019-11-22 | End: 2019-11-22 | Stop reason: HOSPADM

## 2019-11-21 RX ORDER — DOCUSATE SODIUM 100 MG/1
100 CAPSULE, LIQUID FILLED ORAL 2 TIMES DAILY
Status: DISCONTINUED | OUTPATIENT
Start: 2019-11-21 | End: 2019-11-22 | Stop reason: HOSPADM

## 2019-11-21 RX ADMIN — HYDROMORPHONE HYDROCHLORIDE 0.5 MG: 1 INJECTION, SOLUTION INTRAMUSCULAR; INTRAVENOUS; SUBCUTANEOUS at 16:33

## 2019-11-21 RX ADMIN — POLYETHYLENE GLYCOL 3350 17 G: 17 POWDER, FOR SOLUTION ORAL at 19:45

## 2019-11-21 RX ADMIN — SODIUM CHLORIDE 1000 ML: 0.9 INJECTION, SOLUTION INTRAVENOUS at 16:33

## 2019-11-21 RX ADMIN — SODIUM CHLORIDE 90 ML/HR: 0.9 INJECTION, SOLUTION INTRAVENOUS at 21:39

## 2019-11-21 RX ADMIN — MORPHINE SULFATE 15 MG: 15 TABLET ORAL at 21:39

## 2019-11-21 RX ADMIN — HEPARIN SODIUM 5000 UNITS: 5000 INJECTION INTRAVENOUS; SUBCUTANEOUS at 21:39

## 2019-11-21 RX ADMIN — CEFTRIAXONE SODIUM 1000 MG: 10 INJECTION, POWDER, FOR SOLUTION INTRAVENOUS at 22:18

## 2019-11-21 RX ADMIN — HYDROMORPHONE HYDROCHLORIDE 0.5 MG: 1 INJECTION, SOLUTION INTRAMUSCULAR; INTRAVENOUS; SUBCUTANEOUS at 18:00

## 2019-11-21 RX ADMIN — DOCUSATE SODIUM 100 MG: 100 CAPSULE, LIQUID FILLED ORAL at 21:39

## 2019-11-21 NOTE — ED PROVIDER NOTES
History  Chief Complaint   Patient presents with    Abdominal Pain     76-year-old male with history of CKD, hypertension, bradycardia with pacemaker presents emergency department from primary care office for abdominal pain  Patient said pain started 2 days ago after eating to hamburgers at home  He has a waxing and waning since onset  He says that he has had poor appetite has only eaten 2 cookies since that time  Still tolerating fluids  Localizes pain to suprapubic area radiating up toward the epigastrium  Pain is 8/10 severity, sharp in quality  Last bowel movement was 2 days ago and he says he normally has 2-3 bowel movements per day  Says that he is still passing flatus  Denies fever, chills, chest pain, diaphoresis, shortness of breath, nausea, vomiting, diarrhea, urinary symptoms, flank or back pain, any other symptoms or complaints  No history of abdominal surgeries  Did not taking for pain at home  Prior to Admission Medications   Prescriptions Last Dose Informant Patient Reported? Taking? amLODIPine (NORVASC) 5 mg tablet   No Yes   Sig: TAKE 1 TABLET BY MOUTH DAILY   losartan (COZAAR) 100 MG tablet   No Yes   Sig: TAKE 1 TABLET BY MOUTH DAILY   metoprolol succinate (TOPROL-XL) 50 mg 24 hr tablet   No Yes   Sig: TAKE ONE TABLET BY MOUTH EVERY DAY      Facility-Administered Medications: None       Past Medical History:   Diagnosis Date    Abnormal ECG     Bradycardia     CKD (chronic kidney disease)     Dyspnea on effort     Heart block AV second degree     Hypertension     Kidney stone     Left ventricular hypertrophy     Renal insufficiency        Past Surgical History:   Procedure Laterality Date    CARDIAC PACEMAKER PLACEMENT  2017       Family History   Problem Relation Age of Onset    Hypertension Mother     Heart attack Father      I have reviewed and agree with the history as documented      Social History     Tobacco Use    Smoking status: Current Some Day Smoker Types: Cigars    Smokeless tobacco: Never Used    Tobacco comment: 1 a week   Substance Use Topics    Alcohol use: No    Drug use: No        Review of Systems   Constitutional: Negative  Negative for chills, diaphoresis and fever  HENT: Negative  Negative for congestion and rhinorrhea  Eyes: Negative  Respiratory: Negative  Negative for cough and shortness of breath  Cardiovascular: Negative  Negative for chest pain and leg swelling  Gastrointestinal: Positive for abdominal pain and constipation  Negative for abdominal distention, diarrhea, nausea and vomiting  Musculoskeletal: Negative  Negative for back pain and neck pain  Skin: Negative  Negative for rash  Neurological: Negative  Negative for light-headedness and headaches  Hematological: Negative  All other systems reviewed and are negative  Physical Exam  ED Triage Vitals   Temperature Pulse Respirations Blood Pressure SpO2   11/21/19 1603 11/21/19 1603 11/21/19 1603 11/21/19 1603 11/21/19 1603   97 9 °F (36 6 °C) 65 18 (!) 201/112 96 %      Temp Source Heart Rate Source Patient Position - Orthostatic VS BP Location FiO2 (%)   11/21/19 1603 11/21/19 1700 11/21/19 1700 11/21/19 1700 --   Oral Monitor Lying Right arm       Pain Score       11/21/19 1603       9             Orthostatic Vital Signs  Vitals:    11/21/19 1700 11/21/19 1920 11/21/19 2000 11/21/19 2121   BP: 126/81 144/84 155/93 139/79   Pulse: 62 64 62 63   Patient Position - Orthostatic VS: Lying Lying Lying        Physical Exam   Constitutional: He is oriented to person, place, and time  He appears well-developed and well-nourished  No distress  HENT:   Head: Normocephalic and atraumatic  Mouth/Throat: Oropharynx is clear and moist    Eyes: EOM are normal    Neck: Normal range of motion  Neck supple  Cardiovascular: Normal rate, regular rhythm, normal heart sounds and intact distal pulses  Exam reveals no gallop and no friction rub     No murmur heard   Pulmonary/Chest: Effort normal and breath sounds normal  No respiratory distress  He has no wheezes  He has no rales  Abdominal: Soft  Bowel sounds are normal  He exhibits distension  There is tenderness (Suprapubic, periumbilical, and epigastrium)  There is no rigidity, no rebound, no guarding and no CVA tenderness  Slightly tympanitic  Musculoskeletal: He exhibits no edema or tenderness  Neurological: He is alert and oriented to person, place, and time  Clear fluent speech   Skin: Skin is warm and dry  Capillary refill takes less than 2 seconds  Psychiatric: He has a normal mood and affect  Nursing note and vitals reviewed        ED Medications  Medications   amLODIPine (NORVASC) tablet 5 mg (has no administration in time range)   metoprolol succinate (TOPROL-XL) 24 hr tablet 50 mg (has no administration in time range)   sodium chloride 0 9 % infusion (has no administration in time range)   docusate sodium (COLACE) capsule 100 mg (has no administration in time range)   senna (SENOKOT) tablet 8 6 mg (has no administration in time range)   polyethylene glycol (MIRALAX) packet 17 g (has no administration in time range)   ondansetron (ZOFRAN) injection 4 mg (has no administration in time range)   nicotine (NICODERM CQ) 14 mg/24hr TD 24 hr patch 1 patch (has no administration in time range)   heparin (porcine) subcutaneous injection 5,000 Units (has no administration in time range)   acetaminophen (TYLENOL) tablet 650 mg (has no administration in time range)   ceftriaxone (ROCEPHIN) 1 g/50 mL in dextrose IVPB (has no administration in time range)   morphine (MSIR) IR tablet 15 mg (has no administration in time range)   morphine (MSIR) IR tablet 30 mg (has no administration in time range)   HYDROmorphone (DILAUDID) injection 0 5 mg (has no administration in time range)   HYDROmorphone (DILAUDID) injection 0 5 mg (0 5 mg Intravenous Given 11/21/19 7363)   sodium chloride 0 9 % bolus 1,000 mL (0 mL Intravenous Stopped 11/21/19 1921)   HYDROmorphone (DILAUDID) injection 0 5 mg (0 5 mg Intravenous Given 11/21/19 1800)   polyethylene glycol (MIRALAX) packet 17 g (17 g Oral Given 11/21/19 1945)       Diagnostic Studies  Results Reviewed     Procedure Component Value Units Date/Time    Platelet count [[id number]]     Lab Status:  No result Specimen:  Blood     Urine Microscopic [[id number]]  (Abnormal) Collected:  11/21/19 1653    Lab Status:  Final result Specimen:  Urine, Clean Catch Updated:  11/21/19 1928     RBC, UA 20-30 /hpf      WBC, UA 2-4 /hpf      Epithelial Cells Occasional /hpf      Bacteria, UA Occasional /hpf     Troponin I [[id number]]  (Normal) Collected:  11/21/19 1635    Lab Status:  Final result Specimen:  Blood from Arm, Left Updated:  11/21/19 1708     Troponin I <0 02 ng/mL     POCT urinalysis dipstick [[id number]]  (Normal) Resulted:  11/21/19 1653    Lab Status:  Final result Updated:  11/21/19 1707     Color, UA -    CMP [[id number]]  (Abnormal) Collected:  11/21/19 1635    Lab Status:  Final result Specimen:  Blood from Arm, Left Updated:  11/21/19 1705     Sodium 135 mmol/L      Potassium 4 4 mmol/L      Chloride 105 mmol/L      CO2 25 mmol/L      ANION GAP 5 mmol/L      BUN 30 mg/dL      Creatinine 2 55 mg/dL      Glucose 105 mg/dL      Calcium 9 2 mg/dL      AST 24 U/L      ALT 33 U/L      Alkaline Phosphatase 68 U/L      Total Protein 7 8 g/dL      Albumin 3 4 g/dL      Total Bilirubin 1 84 mg/dL      eGFR 25 ml/min/1 73sq m     Narrative:       [place] guidelines for Chronic Kidney Disease (CKD):     Stage 1 with normal or high GFR (GFR > 90 mL/min/1 73 square meters)    Stage 2 Mild CKD (GFR = 60-89 mL/min/1 73 square meters)    Stage 3A Moderate CKD (GFR = 45-59 mL/min/1 73 square meters)    Stage 3B Moderate CKD (GFR = 30-44 mL/min/1 73 square meters)    Stage 4 Severe CKD (GFR = 15-29 mL/min/1 73 square meters)    Stage 5 End Stage CKD (GFR <15 mL/min/1 73 square meters)  Note: GFR calculation is accurate only with a steady state creatinine    Lipase [[id number]]  (Normal) Collected:  11/21/19 1635    Lab Status:  Final result Specimen:  Blood from Arm, Left Updated:  11/21/19 1705     Lipase 86 u/L     Urine Macroscopic, POC [[id number]]  (Abnormal) Collected:  11/21/19 1653    Lab Status:  Final result Specimen:  Urine Updated:  11/21/19 1653     Color, UA [place]     Clarity, UA Slightly Cloudy     pH, UA 5 5     Leukocytes, UA Small     Nitrite, UA Negative     Protein,  (2+) mg/dl      Glucose, UA Negative mg/dl      Ketones, UA Negative mg/dl      Urobilinogen, UA 0 2 E U /dl      Bilirubin, UA Negative     Blood, UA Large     Specific [place], UA 1 015    Narrative:       CLINITEK RESULT    CBC and differential [[id number]]  (Abnormal) Collected:  11/21/19 1635    Lab Status:  Final result Specimen:  Blood from Arm, Left Updated:  11/21/19 1644     WBC 14 06 Thousand/uL      RBC 5 51 Million/uL      Hemoglobin 16 8 g/dL      Hematocrit 50 5 %      MCV 92 fL      MCH 30 5 pg      MCHC 33 3 g/dL      RDW 12 8 %      MPV 9 4 fL      Platelets 762 Thousands/uL      nRBC 0 /100 WBCs      Neutrophils Relative 80 %      Immat GRANS % 0 %      Lymphocytes Relative 9 %      Monocytes Relative 11 %      Eosinophils Relative 0 %      Basophils Relative 0 %      Neutrophils Absolute 11 23 Thousands/µL      Immature Grans Absolute 0 05 Thousand/uL      Lymphocytes Absolute 1 19 Thousands/µL      Monocytes Absolute 1 51 Thousand/µL      Eosinophils Absolute 0 03 Thousand/µL      Basophils Absolute 0 05 Thousands/µL                  CT abdomen pelvis wo contrast   Final Result by Brendon Mart MD (11/21 1802)      2 mm dependent bladder calculus likely recently passed from the right ureter as there is mild fat stranding surrounding and mild fullness of the right kidney and right ureter  The study was marked in [facility] for immediate notification  Workstation performed: [id number]               Procedures  Procedures        ED Course           Identification of Seniors at Risk      Most Recent Value   (ISAR) Identification of Seniors at Risk   Before the illness or injury that brought you to the Emergency, did you need someone to help you on a regular basis? 0 Filed at: 11/21/2019 1604   In the last 24 hours, have you needed more help than usual?  0 Filed at: 11/21/2019 1604   Have you been hospitalized for one or more nights during the past 6 months? 0 Filed at: 11/21/2019 1604   In general, do you see well?  0 Filed at: 11/21/2019 1604   In general, do you have serious problems with your memory? 0 Filed at: 11/21/2019 1604   Do you take more than three different medications every day?  0 Filed at: 11/21/2019 1604   ISAR Score  0 Filed at: 11/21/2019 1604                          [facility]  Number of Diagnoses or Management Options  Abdominal pain:   Diagnosis management comments: 16-year-old male with history of CKD, hypertension, bradycardia with pacemaker presents emergency department from primary care office for abdominal pain  Within the differential consider bowel obstruction, pancreatitis, GERD, peptic ulcer, gastritis, diverticulitis, UTI, appendicitis  Will obtain abdominal labs and CT to evaluate  Final assessment:  CT reveals 2 mm renal stone that appears to have recently passed from right ureter to bladder  Patient feeling well at this time  He does have a marked ANAND so admitting to Medicine for further management  Urinalysis shows occasional bacteria but appears contaminated so holding off on antibiotics at this time  Will monitor closely for infection  Discussed with admitting physician who agrees to accept patient for further management  Patient remains stable throughout ED course         Disposition  Final diagnoses:   Abdominal pain   Kidney stone     Time reflects when diagnosis was documented in both MDM as applicable and the Disposition within this note     Time User Action Codes Description Comment    11/21/2019  4:34 PM MinorGlenny Add [R10 9] Abdominal pain     11/21/2019  9:34 PM Maryam Aguilar Add [N20 0] Kidney stone       ED Disposition     None      Follow-up Information    None         Current Discharge Medication List      CONTINUE these medications which have NOT CHANGED    Details   amLODIPine (NORVASC) 5 mg tablet TAKE 1 TABLET BY MOUTH DAILY  Qty: 90 tablet, Refills: 3    Associated Diagnoses: Hypertension, unspecified type      losartan (COZAAR) 100 MG tablet TAKE 1 TABLET BY MOUTH DAILY  Qty: 90 tablet, Refills: 3    Associated Diagnoses: Hypertension, unspecified type      metoprolol succinate (TOPROL-XL) 50 mg 24 hr tablet TAKE ONE TABLET BY MOUTH EVERY DAY  Qty: 30 tablet, Refills: 6    Associated Diagnoses: SVT (supraventricular tachycardia) (HCC)           No discharge procedures on file  ED Provider  Attending physically available and evaluated Jeffery Shafer I managed the patient along with the ED Attending      Electronically Signed by         Beatriz Haas MD  11/21/19 0861

## 2019-11-21 NOTE — PROGRESS NOTES
FAMILY PRACTICE OFFICE VISIT       NAME: Cori Noriega  AGE: 71 y o  SEX: male       : 1955        MRN: [id number]    DATE: 2019  TIME: 10:22 PM    Assessment and Plan     Problem List Items Addressed This Visit     None      Visit Diagnoses     Lower abdominal pain    -  Primary    Relevant Orders    Transfer to other facility    Right lower quadrant abdominal pain        Relevant Orders    Transfer to other facility    Mild nausea        Relevant Orders    Transfer to other facility        69-year-old male presents with abdominal pain, distention, nausea as well as change in bowel habit since Tuesday night after he consumed half a lb of hamburger meat  Denies fevers, chills, nausea, vomiting, dysuria  Patient appears uncomfortable and in distress  Because of this, I would like him to be evaluated in the emergency room  He is agreeable with this plan  Patient's wife will take him to the ER  Will notify emergency room  There are no Patient Instructions on file for this visit  Chief Complaint     Chief Complaint   Patient presents with    Abdominal Pain     no boul movment x 1 week        History of Present Illness     HPI  69-year-old male presents today with complaints abdominal pain and distension  Patient states he normally has a bowel movement to 3 times a day however his last bowel movement was on Tuesday night  Patient did consume to large hamburgers totally half a lb on Tuesday night  He has only had 3 cookies yesterday as well as a prunes today along with 4 glasses of water  He is passing gas  Patient describes his abdominal pain as a muscle spasm  He is not comfortable  Review of Systems   Review of Systems   Constitutional: Positive for appetite change  Negative for chills and fever  Respiratory: Negative for shortness of breath  Cardiovascular: Negative  Gastrointestinal: Positive for abdominal pain and nausea  Negative for blood in stool and vomiting  Genitourinary: Negative for dysuria  Active Problem List     Patient Active Problem List   Diagnosis    Intracranial bleed (HCC)    Hypertension    History of pacemaker    Heart block    SVT (supraventricular tachycardia) (HCC)    Total bilirubin, elevated    Obesity (BMI 30-39  9)       Past Medical History:  Past Medical History:   Diagnosis Date    Abnormal ECG     Bradycardia     CKD (chronic kidney disease)     Dyspnea on effort     Heart block AV second degree     Hypertension     Kidney stone     Left ventricular hypertrophy     Renal insufficiency        Past Surgical History:  Past Surgical History:   Procedure Laterality Date    CARDIAC PACEMAKER PLACEMENT  2017       Family History:  Family History   Problem Relation Age of Onset    Hypertension Mother     Heart attack Father        Social History:  Social History     Socioeconomic History    Marital status: /Civil Union     Spouse name: Not on file    Number of children: Not on file    Years of education: Not on file    Highest education level: Not on file   Occupational History    Not on file   Social Needs    Financial resource strain: Not on file    Food insecurity:     Worry: Not on file     Inability: Not on file    Transportation needs:     Medical: Not on file     Non-medical: Not on file   Tobacco Use    Smoking status: Current Some Day Smoker     Types: Cigars    Smokeless tobacco: Never Used    Tobacco comment: 1 a week   Substance and Sexual Activity    Alcohol use: No    Drug use: No    Sexual activity: Not on file   Lifestyle    Physical activity:     Days per week: Not on file     Minutes per session: Not on file    Stress: Not on file   Relationships    Social connections:     Talks on phone: Not on file     Gets together: Not on file     Attends Yazidism service: Not on file     Active member of club or organization: Not on file     Attends meetings of clubs or organizations: Not on file Relationship status: Not on file    Intimate partner violence:     Fear of current or ex partner: Not on file     Emotionally abused: Not on file     Physically abused: Not on file     Forced sexual activity: Not on file   Other Topics Concern    Not on file   Social History Narrative    Not on file     I have reviewed the patient's medical history in detail; there are no changes to the history as noted in the electronic medical record  Objective     Vitals:    11/21/19 1528   BP: 130/88   Pulse:    Resp:    Temp:    SpO2:      Wt Readings from Last 3 Encounters:   11/21/19 102 kg (224 lb 1 6 oz)   11/21/19 102 kg (225 lb 4 oz)   08/26/19 100 kg (221 lb 6 4 oz)     Vitals:    11/21/19 1515 11/21/19 1528   BP: 130/90 130/88   BP Location: Left arm    Patient Position: Sitting    Cuff Size: Large    Pulse: 80    Resp: 16    Temp: 97 9 °F (36 6 °C)    TempSrc: Tympanic    SpO2: 93%    Weight: 102 kg (225 lb 4 oz)    Height: 6' (1 829 m)          Physical Exam   Constitutional: He appears well-developed and well-nourished  HENT:   Head: Normocephalic and atraumatic  Mouth/Throat: Oropharynx is clear and moist    Eyes: Pupils are equal, round, and reactive to light  Conjunctivae and EOM are normal    Neck: Normal range of motion  Neck supple  Cardiovascular: Normal rate, regular rhythm and normal heart sounds  Pulmonary/Chest: Effort normal and breath sounds normal    Abdominal: Soft  Bowel sounds are normal  He exhibits distension  There is tenderness  Patient's abdomen is firm and distended  Use tender on palpation in epigastric region and right lower quadrant   Musculoskeletal: Normal range of motion  He exhibits no edema  Lymphadenopathy:     He has no cervical adenopathy  Neurological: He is alert  Psychiatric: He has a normal mood and affect  Nursing note and vitals reviewed        Pertinent Laboratory/Diagnostic Studies:  Lab Results   Component Value Date    BUN 22 11/22/2019 CREATININE 1 50 (H) 11/22/2019    CALCIUM 9 0 11/22/2019    K 4 1 11/22/2019    CO2 26 11/22/2019     11/22/2019     Lab Results   Component Value Date    ALT 29 11/22/2019    AST 17 11/22/2019    ALKPHOS 64 11/22/2019       Lab Results   Component Value Date    WBC 12 70 (H) 11/22/2019    HGB 16 2 11/22/2019    HCT 49 5 (H) 11/22/2019    MCV 93 11/22/2019     11/22/2019       No results found for: TSH    No results found for: CHOL  No results found for: TRIG  No results found for: HDL  No results found for: LDLCALC  No results found for: HGBA1C    Results for orders placed or performed during the hospital encounter of 10/05/18   CBC and differential   Result Value Ref Range    WBC 7 63 4 31 - 10 16 Thousand/uL    RBC 5 35 3 88 - 5 62 Million/uL    Hemoglobin 16 4 12 0 - 17 0 g/dL    Hematocrit 49 0 36 5 - 49 3 %    MCV 92 82 - 98 fL    MCH 30 7 26 8 - 34 3 pg    MCHC 33 5 31 4 - 37 4 g/dL    RDW 12 7 11 6 - 15 1 %    MPV 9 7 8 9 - 12 7 fL    Platelets 924 862 - 256 Thousands/uL    nRBC 0 /100 WBCs    Neutrophils Relative 68 43 - 75 %    Immat GRANS % 0 0 - 2 %    Lymphocytes Relative 21 14 - 44 %    Monocytes Relative 7 4 - 12 %    Eosinophils Relative 3 0 - 6 %    Basophils Relative 1 0 - 1 %    Neutrophils Absolute 5 23 1 85 - 7 62 Thousands/µL    Immature Grans Absolute 0 02 0 00 - 0 20 Thousand/uL    Lymphocytes Absolute 1 57 0 60 - 4 47 Thousands/µL    Monocytes Absolute 0 50 0 17 - 1 22 Thousand/µL    Eosinophils Absolute 0 26 0 00 - 0 61 Thousand/µL    Basophils Absolute 0 05 0 00 - 0 10 Thousands/µL   Comprehensive metabolic panel   Result Value Ref Range    Sodium 140 136 - 145 mmol/L    Potassium 4 4 3 5 - 5 3 mmol/L    Chloride 105 100 - 108 mmol/L    CO2 30 21 - 32 mmol/L    ANION GAP 5 4 - 13 mmol/L    BUN 23 5 - 25 mg/dL    Creatinine 1 36 (H) 0 60 - 1 30 mg/dL    Glucose 97 65 - 140 mg/dL    Calcium 9 1 8 3 - 10 1 mg/dL    AST 29 5 - 45 U/L    ALT 50 12 - 78 U/L    Alkaline Phosphatase 80 46 - 116 U/L    Total Protein 8 2 6 4 - 8 2 g/dL    Albumin 3 8 3 5 - 5 0 g/dL    Total Bilirubin 0 86 0 20 - 1 00 mg/dL    eGFR 55 ml/min/1 73sq m   Magnesium   Result Value Ref Range    Magnesium 2 5 1 6 - 2 6 mg/dL   Urinalysis with microscopic   Result Value Ref Range    Clarity, UA Cloudy     Color, UA Yellow     Specific [place], UA 1 005 1 003 - 1 030    pH, UA 5 5 4 5 - 8 0    Glucose, UA Negative Negative mg/dl    Ketones, UA Negative Negative mg/dl    Blood, UA Negative Negative    Protein, UA Negative Negative mg/dl    Nitrite, UA Negative Negative    Bilirubin, UA Negative Negative    Urobilinogen, UA 0 2 0 2, 1 0 E U /dl E U /dl    Leukocytes, UA Small (A) Negative    WBC, UA None Seen None Seen, 0-5, 5-55, 5-65 /hpf    RBC, UA None Seen None Seen, 0-5 /hpf    Bacteria, UA None Seen None Seen, Occasional /hpf    Epithelial Cells None Seen None Seen, Occasional /hpf   ECG 12 lead   Result Value Ref Range    Ventricular Rate 59 BPM    Atrial Rate 59 BPM    MI Interval 184 ms    QRSD Interval 122 ms    QT Interval 416 ms    QTC Interval 411 ms    P Axis 91 degrees    QRS Axis -67 degrees    T Wave Axis 114 degrees       Orders Placed This Encounter   Procedures    Transfer to other facility       ALLERGIES:  Allergies   Allergen Reactions    Bactrim [Sulfamethoxazole-Trimethoprim] Hives    Iv Contrast [Iodinated Diagnostic Agents]        Current Medications     Current Outpatient Medications   Medication Sig Dispense Refill    amLODIPine (NORVASC) 5 mg tablet TAKE 1 TABLET BY MOUTH DAILY 90 tablet 3    losartan (COZAAR) 100 MG tablet TAKE 1 TABLET BY MOUTH DAILY 90 tablet 3    metoprolol succinate (TOPROL-XL) 50 mg 24 hr tablet TAKE ONE TABLET BY MOUTH EVERY DAY 30 tablet 6    acetaminophen (TYLENOL) 325 mg tablet Take 2 tablets (650 mg total) by mouth every 6 (six) hours as needed for mild pain 30 tablet 0    docusate sodium (COLACE) 100 mg capsule Take 1 capsule (100 mg total) by mouth 2 (two) times a day as needed for constipation 10 capsule 0    oxyCODONE (ROXICODONE) 5 mg immediate release tablet Take 1 tablet (5 mg total) by mouth every 6 (six) hours as needed for severe painMax Daily Amount: 20 mg 12 tablet 0    tamsulosin (FLOMAX) 0 4 mg Take 1 capsule (0 4 mg total) by mouth daily with dinner 30 capsule 0     No current facility-administered medications for this visit  Health Maintenance     Health Maintenance   Topic Date Due    Hepatitis C Screening  1955    DTaP,Tdap,and Td Vaccines (1 - Tdap) 07/02/1965    HIV Screening  07/02/1969    BMI: Followup Plan  07/02/1972    Pneumococcal Vaccine: 65+ Years (1 of 2 - PCV13) 07/02/2019    Influenza Vaccine  03/19/2020 (Originally 7/1/2019)    Depression Screening PHQ  03/19/2020    Fall Risk  11/21/2020    BMI: Adult  11/21/2020    CRC Screening: Colonoscopy  12/17/2023    Pneumococcal Vaccine: Pediatrics (0 to 5 Years) and At-Risk Patients (6 to 59 Years)  Aged Out    HIB Vaccine  Aged Out    Hepatitis B Vaccine  Aged Out    IPV Vaccine  Aged Out    Hepatitis A Vaccine  Aged Out    Meningococcal ACWY Vaccine  Aged Out    HPV Vaccine  Aged Out     There is no immunization history for the selected administration types on file for this patient      Priscila Mcfarlane MD

## 2019-11-21 NOTE — ED ATTENDING ATTESTATION
Terrance Alfaro DO, saw and evaluated the patient  I have discussed the patient with the resident/non-physician practitioner and agree with the resident's/non-physician practitioner's findings, Plan of Care, and MDM as documented in the resident's/non-physician practitioner's note, except where noted  All available labs and Radiology studies were reviewed  At this point I agree with the current assessment done in the Emergency Department  I have conducted an independent evaluation of this patient including a focused history and a physical exam     ED Note - Flori Lim 71 y o  male MRN: [id number]  Unit/Bed#: ED 14 Encounter: [id number]    History of Present Illness   HPI  Flori Lim is a 71 y o  male who presents for evaluation of intermittent/ waxing-waning sharp and severe suprapubic abdominal pain that radiates up to the epigastrium  Pain onset approx  2 days ago  Last BM was 2 days ago  +ve flatus  Patient denies chest pain, ripping or tearing type pain, nausea or vomiting, dizziness, diaphoresis  No urinary symptoms or flank pain  No melena or hematochezia  Poor p o  Intake since onset of pain  No home remedies  REVIEW OF SYSTEMS  See HPI for further details  12 systems reviewed and otherwise negative except as noted     Historical Information     PAST MEDICAL HISTORY  Past Medical History:   Diagnosis Date    Abnormal ECG     Bradycardia     CKD (chronic kidney disease)     Dyspnea on effort     Heart block AV second degree     Hypertension     Kidney stone     Left ventricular hypertrophy     Renal insufficiency        FAMILY HISTORY  Family History   Problem Relation Age of Onset    Hypertension Mother     Heart attack Father        SOCIAL HISTORY  Social History     Socioeconomic History    Marital status: /Civil Union     Spouse name: None    Number of children: None    Years of education: None    Highest education level: None   Occupational History    None Social Needs    Financial resource strain: None    Food insecurity:     Worry: None     Inability: None    Transportation needs:     Medical: None     Non-medical: None   Tobacco Use    Smoking status: Current Some Day Smoker     Types: Cigars    Smokeless tobacco: Never Used    Tobacco comment: 1 a week   Substance and Sexual Activity    Alcohol use: No    Drug use: No    Sexual activity: None   Lifestyle    Physical activity:     Days per week: None     Minutes per session: None    Stress: None   Relationships    Social connections:     Talks on phone: None     Gets together: None     Attends Zoroastrianism service: None     Active member of club or organization: None     Attends meetings of clubs or organizations: None     Relationship status: None    Intimate partner violence:     Fear of current or ex partner: None     Emotionally abused: None     Physically abused: None     Forced sexual activity: None   Other Topics Concern    None   Social History Narrative    None       SURGICAL HISTORY  Past Surgical History:   Procedure Laterality Date    CARDIAC PACEMAKER PLACEMENT  2017     Meds/Allergies     CURRENT MEDICATIONS    Current Facility-Administered Medications:     sodium chloride 0 9 % bolus 1,000 mL, 1,000 mL, Intravenous, Once, Maryam Aguilar MD, Last Rate: 1,000 mL/hr at 11/21/19 1633, 1,000 mL at 11/21/19 1633    Current Outpatient Medications:     amLODIPine (NORVASC) 5 mg tablet, TAKE 1 TABLET BY MOUTH DAILY, Disp: 90 tablet, Rfl: 3    losartan (COZAAR) 100 MG tablet, TAKE 1 TABLET BY MOUTH DAILY, Disp: 90 tablet, Rfl: 3    metoprolol succinate (TOPROL-XL) 50 mg 24 hr tablet, TAKE ONE TABLET BY MOUTH EVERY DAY, Disp: 30 tablet, Rfl: 6    (Not in a hospital admission)    ALLERGIES  Allergies   Allergen Reactions    Bactrim [Sulfamethoxazole-Trimethoprim] Hives    Iv Contrast [Iodinated Diagnostic Agents]      Objective     PHYSICAL EXAM    VITAL SIGNS: Blood pressure (!) 201/112, pulse 65, temperature 97 9 °F (36 6 °C), temperature source Oral, resp  rate 18, height 6' (1 829 m), weight 101 kg (222 lb), SpO2 96 %  Constitutional:  Appears well developed and well nourished, no acute distress, non-toxic appearance   Eyes:  PERRL, EOMI, conjunctivae pink, sclerae non-icteric    HENT:  Normocephalic/Atraumatic, no rhinorrhea, mucous membranes moist   Neck: normal range of motion, no tenderness, supple   Respiratory:  No respiratory distress, normal breath sounds   Cardiovascular:  Normal rate, normal rhythm    GI:  Soft, +ve epigastric/suprapubic tender, non-distended  :  No CVAT, no flank ecchymosis  Musculoskeletal:  No swelling or edema, no tenderness, no deformities  Integument:  Pink, warm, dry, Well hydrated, no rash, no erythema, no bullae   Lymphatic:  No cervical/ tonsillar/ submandibular lymphadenopathy noted   Neurologic:  Awake, Alert & oriented x 3, CN 2-12 intact, no focal neurological deficits, motor function intact  Psychiatric:  Speech and behavior appropriate       ED COURSE and MDM:    Assessment/Plan   Assessment:  Jeanne Gutierres is a 71 y o  male presents for evaluation of abdominal pain  Plan:  Labs, CT a/p, IV fluids, symptom management, disposition as appropriate  CRITICAL CARE TIME: 0 minutes      Portions of the record may have been created with voice recognition software  Occasional wrong word or "sound a like" substitutions may have occurred due to the inherent limitations of voice recognition software       ED Provider  Electronically Signed by

## 2019-11-22 VITALS
DIASTOLIC BLOOD PRESSURE: 81 MMHG | WEIGHT: 224.1 LBS | TEMPERATURE: 98.7 F | BODY MASS INDEX: 30.35 KG/M2 | RESPIRATION RATE: 18 BRPM | SYSTOLIC BLOOD PRESSURE: 134 MMHG | OXYGEN SATURATION: 97 % | HEIGHT: 72 IN | HEART RATE: 61 BPM

## 2019-11-22 PROBLEM — E87.1 HYPONATREMIA: Status: RESOLVED | Noted: 2019-11-21 | Resolved: 2019-11-22

## 2019-11-22 PROBLEM — N18.30 ACUTE RENAL FAILURE SUPERIMPOSED ON STAGE 3 CHRONIC KIDNEY DISEASE (HCC): Status: RESOLVED | Noted: 2019-11-21 | Resolved: 2019-11-22

## 2019-11-22 PROBLEM — N17.9 ACUTE RENAL FAILURE SUPERIMPOSED ON STAGE 3 CHRONIC KIDNEY DISEASE (HCC): Status: RESOLVED | Noted: 2019-11-21 | Resolved: 2019-11-22

## 2019-11-22 PROBLEM — I16.0 HYPERTENSIVE URGENCY: Status: RESOLVED | Noted: 2019-11-21 | Resolved: 2019-11-22

## 2019-11-22 PROBLEM — R93.5 ABNORMAL CT OF THE ABDOMEN: Status: RESOLVED | Noted: 2019-11-21 | Resolved: 2019-11-22

## 2019-11-22 LAB
ALBUMIN SERPL BCP-MCNC: 3.4 G/DL (ref 3.5–5)
ALP SERPL-CCNC: 64 U/L (ref 46–116)
ALT SERPL W P-5'-P-CCNC: 29 U/L (ref 12–78)
ANION GAP SERPL CALCULATED.3IONS-SCNC: 5 MMOL/L (ref 4–13)
AST SERPL W P-5'-P-CCNC: 17 U/L (ref 5–45)
BILIRUB DIRECT SERPL-MCNC: 0.25 MG/DL (ref 0–0.2)
BILIRUB SERPL-MCNC: 1.47 MG/DL (ref 0.2–1)
BUN SERPL-MCNC: 22 MG/DL (ref 5–25)
CALCIUM SERPL-MCNC: 9 MG/DL (ref 8.3–10.1)
CHLORIDE SERPL-SCNC: 108 MMOL/L (ref 100–108)
CO2 SERPL-SCNC: 26 MMOL/L (ref 21–32)
CREAT SERPL-MCNC: 1.5 MG/DL (ref 0.6–1.3)
ERYTHROCYTE [DISTWIDTH] IN BLOOD BY AUTOMATED COUNT: 12.9 % (ref 11.6–15.1)
GFR SERPL CREATININE-BSD FRML MDRD: 48 ML/MIN/1.73SQ M
GLUCOSE SERPL-MCNC: 102 MG/DL (ref 65–140)
HCT VFR BLD AUTO: 49.5 % (ref 36.5–49.3)
HGB BLD-MCNC: 16.2 G/DL (ref 12–17)
MCH RBC QN AUTO: 30.3 PG (ref 26.8–34.3)
MCHC RBC AUTO-ENTMCNC: 32.7 G/DL (ref 31.4–37.4)
MCV RBC AUTO: 93 FL (ref 82–98)
PLATELET # BLD AUTO: 219 THOUSANDS/UL (ref 149–390)
PMV BLD AUTO: 9.3 FL (ref 8.9–12.7)
POTASSIUM SERPL-SCNC: 4.1 MMOL/L (ref 3.5–5.3)
PROT SERPL-MCNC: 7.8 G/DL (ref 6.4–8.2)
RBC # BLD AUTO: 5.35 MILLION/UL (ref 3.88–5.62)
SODIUM SERPL-SCNC: 139 MMOL/L (ref 136–145)
WBC # BLD AUTO: 12.7 THOUSAND/UL (ref 4.31–10.16)

## 2019-11-22 PROCEDURE — 99239 HOSP IP/OBS DSCHRG MGMT >30: CPT | Performed by: INTERNAL MEDICINE

## 2019-11-22 PROCEDURE — 80048 BASIC METABOLIC PNL TOTAL CA: CPT | Performed by: HOSPITALIST

## 2019-11-22 PROCEDURE — 85027 COMPLETE CBC AUTOMATED: CPT | Performed by: HOSPITALIST

## 2019-11-22 PROCEDURE — 80076 HEPATIC FUNCTION PANEL: CPT | Performed by: HOSPITALIST

## 2019-11-22 RX ORDER — DOCUSATE SODIUM 100 MG/1
100 CAPSULE, LIQUID FILLED ORAL 2 TIMES DAILY PRN
Qty: 10 CAPSULE | Refills: 0 | Status: ON HOLD
Start: 2019-11-22 | End: 2020-06-29

## 2019-11-22 RX ORDER — OXYCODONE HYDROCHLORIDE 5 MG/1
5 TABLET ORAL EVERY 6 HOURS PRN
Qty: 12 TABLET | Refills: 0 | Status: SHIPPED | OUTPATIENT
Start: 2019-11-22 | End: 2020-06-29

## 2019-11-22 RX ORDER — ACETAMINOPHEN 325 MG/1
650 TABLET ORAL EVERY 6 HOURS PRN
Qty: 30 TABLET | Refills: 0 | Status: SHIPPED | OUTPATIENT
Start: 2019-11-22

## 2019-11-22 RX ORDER — TAMSULOSIN HYDROCHLORIDE 0.4 MG/1
0.4 CAPSULE ORAL
Qty: 30 CAPSULE | Refills: 0 | Status: SHIPPED | OUTPATIENT
Start: 2019-11-22 | End: 2021-10-12 | Stop reason: ALTCHOICE

## 2019-11-22 RX ADMIN — HEPARIN SODIUM 5000 UNITS: 5000 INJECTION INTRAVENOUS; SUBCUTANEOUS at 07:54

## 2019-11-22 RX ADMIN — DOCUSATE SODIUM 100 MG: 100 CAPSULE, LIQUID FILLED ORAL at 09:20

## 2019-11-22 RX ADMIN — HEPARIN SODIUM 5000 UNITS: 5000 INJECTION INTRAVENOUS; SUBCUTANEOUS at 13:23

## 2019-11-22 RX ADMIN — NICOTINE 1 PATCH: 14 PATCH TRANSDERMAL at 09:18

## 2019-11-22 RX ADMIN — METOPROLOL SUCCINATE 50 MG: 50 TABLET, EXTENDED RELEASE ORAL at 09:20

## 2019-11-22 RX ADMIN — SENNOSIDES 8.6 MG: 8.6 TABLET, FILM COATED ORAL at 09:20

## 2019-11-22 RX ADMIN — AMLODIPINE BESYLATE 5 MG: 5 TABLET ORAL at 09:20

## 2019-11-22 RX ADMIN — MORPHINE SULFATE 30 MG: 15 TABLET ORAL at 02:23

## 2019-11-22 NOTE — H&P
H&P- Doneen Schwab 1955, 71 y o  male MRN: [id number]    Unit/Bed#: -01 Encounter: [id number]    Primary Care Provider: Norman Olvera MD   Date and time admitted to hospital: 11/21/2019  3:59 PM        * Acute renal failure superimposed on stage 3 chronic kidney disease ([facility] [address] )  Assessment & Plan  A year ago creatinine was 1 36-1 37 ( 1 36 on Oct 5, 2018) today creatinine 2 55, most likely secondary to passing stone, volume depletion dehydration decreased oral intake in the setting of ARB use  Will provide IV hydration repeat BMP in a m  Hold losartan  Check renal U/S  Avoid nephrotoxin drugs and hypotension    Hypertensive urgency  Assessment & Plan  Blood pressure on admission 201/112  Went down to 140/80 with better pain control  Will continue pain management  Hold losartan in the setting of ANAND on CKD  Continue beta-blocker and amlodipine with holding parameters    Abnormal CT of the abdomen  Assessment & Plan  CT of abdomen and pelvis impression"2 mm dependent bladder calculus likely recently passed from the right ureter as there is mild fat stranding surrounding and mild fullness of the right kidney and right ureter "  Continue management as outlined above  Nurse will strain urine  Will defer need of urology evaluation to the primary team    Hyponatremia  Assessment & Plan  Mildly decreased sodium level most likely secondary to dehydration and pain  Continue IV fluids overnight  Re-evaluate need of normal saline in a m  Repeat BMP in a m  Total bilirubin, elevated  Assessment & Plan  Most likely secondary to dehydration   Will repeat total bilirubin in a m  History of pacemaker  Assessment & Plan  Continue to follow up with EP and Cardiology    Obesity (BMI 30-39  9)  Assessment & Plan  Will gradually advance diet to low-calorie if  patient tolerates p o    Currently he is NPO for abdominal pain       VTE Prophylaxis: Heparin  / sequential compression device   Code Status: full  POLST: There is no POLST form on file for this patient (pre-hospital)  Discussion with family:  No family members at bedside    Anticipated Length of Stay:  Patient will be admitted on an Inpatient basis with an anticipated length of stay of  >2 midnights  Justification for Hospital Stay:  Need of IV hydration short course of IV antibiotic    Total Time for Visit, including Counseling / Coordination of Care: 45 minutes  Greater than 50% of this total time spent on direct patient counseling and coordination of care  Chief Complaint:   Abdominal pain    History of Present Illness:    Munir Leggett is a 71 y o  male with history of nephrolithiasis ,CKD 3 , dual pacemaker who was referred by his PCP for further evaluation of abdominal pain ,decreased oral intake  Upon my assessment patient endorses 2 days of decreased oral intake and suprapubic sharp, 10/10  pain radiating to the epigastrium similar to his previous presentation when he was diagnosed with kidney stones  He also endorses 2-3 liquid bowel movements since he was given laxative in the ER due to 2 days of constipation  He denies nausea vomiting  sinan hematuria fever chills  CT of abdomen and pelvis reported "2 mm dependent bladder calculus likely recently passed from the right ureter as there is mild fat stranding surrounding and mild fullness of the right kidney and right ureter "  The significant findings is getting worse creatinine of 2 55 compared to 1 36 on Oct 5, 2018  Urinalysis with no significant findings pyuria or bacteriuria, RBC 20-30  Patient afebrile with elevated WBC of 14 6   TB 1 84  Patient will require IV hydration, short course of antibiotic, admitted to the hospitalist service on inpatient basis    Review of Systems:    Review of Systems   Gastrointestinal: Positive for abdominal pain         Past Medical and Surgical History:     Past Medical History:   Diagnosis Date    Abnormal ECG     Bradycardia     CKD (chronic kidney disease)     Dyspnea on effort     Heart block AV second degree     Hypertension     Kidney stone     Left ventricular hypertrophy     Renal insufficiency        Past Surgical History:   Procedure Laterality Date    CARDIAC PACEMAKER PLACEMENT  2017       Meds/Allergies:    Prior to Admission medications    Medication Sig Start Date End Date Taking? Authorizing Provider   amLODIPine (NORVASC) 5 mg tablet TAKE 1 TABLET BY MOUTH DAILY 10/31/19  Yes Nicki Longo MD   losartan (COZAAR) 100 MG tablet TAKE 1 TABLET BY MOUTH DAILY 10/31/19  Yes Nicki Longo MD   metoprolol succinate (TOPROL-XL) 50 mg 24 hr tablet TAKE ONE TABLET BY MOUTH EVERY DAY 10/31/19  Yes Nicki Longo MD     I have reviewed home medications with a medical source (PCP, Pharmacy, other)  Allergies: Allergies   Allergen Reactions    Bactrim [Sulfamethoxazole-Trimethoprim] Hives    Iv Contrast [Iodinated Diagnostic Agents]        Social History:     Marital Status: /Civil Union   Occupation: none  Patient Pre-hospital Living Situation:  Home  Patient Pre-hospital Level of Mobility: reg  Patient Pre-hospital Diet Restrictions: reg  Substance Use History:   Social History     Substance and Sexual Activity   Alcohol Use No     Social History     Tobacco Use   Smoking Status Current Some Day Smoker    Types: Cigars   Smokeless Tobacco Never Used   Tobacco Comment    1 a week     Social History     Substance and Sexual Activity   Drug Use No       Family History:    non-contributory    Physical Exam:     Vitals:   Blood Pressure: 139/79 (11/21/19 2121)  Pulse: 63 (11/21/19 2121)  Temperature: 99 1 °F (37 3 °C) (11/21/19 2121)  Temp Source: Oral (11/21/19 1603)  Respirations: 20 (11/21/19 2121)  Height: 6' (182 9 cm) (11/21/19 2121)  Weight - Scale: 102 kg (224 lb 1 6 oz) (11/21/19 2121)  SpO2: 95 % (11/21/19 2209)    Physical Exam   Constitutional: No distress  HENT:   Head: Normocephalic     Eyes: Pupils are equal, round, and reactive to light    Neck: Normal range of motion  Cardiovascular: Normal rate  Pulmonary/Chest: Effort normal    Abdominal: There is tenderness  There is no rebound and no guarding  Musculoskeletal: Normal range of motion  Neurological: He is alert  No cranial nerve deficit  Skin: Skin is warm  He is diaphoretic  Psychiatric: He has a normal mood and affect  Additional Data:     Lab Results: I have personally reviewed pertinent reports  Results from last 7 days   Lab Units 11/21/19  1635   WBC Thousand/uL 14 06*   HEMOGLOBIN g/dL 16 8   HEMATOCRIT % 50 5*   PLATELETS Thousands/uL 203   NEUTROS PCT % 80*   LYMPHS PCT % 9*   MONOS PCT % 11   EOS PCT % 0     Results from last 7 days   Lab Units 11/21/19  1635   SODIUM mmol/L 135*   POTASSIUM mmol/L 4 4   CHLORIDE mmol/L 105   CO2 mmol/L 25   BUN mg/dL 30*   CREATININE mg/dL 2 55*   ANION GAP mmol/L 5   CALCIUM mg/dL 9 2   ALBUMIN g/dL 3 4*   TOTAL BILIRUBIN mg/dL 1 84*   ALK PHOS U/L 68   ALT U/L 33   AST U/L 24   GLUCOSE RANDOM mg/dL 105                       Imaging: I have personally reviewed pertinent reports  CT abdomen pelvis wo contrast   Final Result by Vonda Elias MD (11/21 1802)      2 mm dependent bladder calculus likely recently passed from the right ureter as there is mild fat stranding surrounding and mild fullness of the right kidney and right ureter  The study was marked in [facility] for immediate notification  Workstation performed: [id number]               Allscripts / Epic Records Reviewed: Yes     ** Please Note: This note has been constructed using a voice recognition system   **

## 2019-11-22 NOTE — ASSESSMENT & PLAN NOTE
CT of abdomen and pelvis impression"2 mm dependent bladder calculus likely recently passed from the right ureter as there is mild fat stranding surrounding and mild fullness of the right kidney and right ureter "  Continue management as outlined above  Nurse will strain urine    Will defer need of urology evaluation to the primary team

## 2019-11-22 NOTE — PLAN OF CARE
Problem: Potential for Falls  Goal: Patient will remain free of falls  Description  INTERVENTIONS:  - Assess patient frequently for physical needs  -  Identify cognitive and physical deficits and behaviors that affect risk of falls    -  [place] fall precautions as indicated by assessment   - Educate patient/family on patient safety including physical limitations  - Instruct patient to call for assistance with activity based on assessment  - Modify environment to reduce risk of injury  - Consider OT/PT consult to assist with strengthening/mobility  Outcome: Progressing

## 2019-11-22 NOTE — DISCHARGE INSTRUCTIONS
Kidney Stones   WHAT YOU NEED TO KNOW:   Kidney stones form in the urinary system when the water and waste in your urine are out of balance  When this happens, certain types of waste crystals separate from the urine  The crystals build up and form kidney stones  You may have 1 or more kidney stones  DISCHARGE INSTRUCTIONS:   Seek care immediately:   · You have vomiting that is not relieved by medicine  Contact your healthcare provider if:   · You have a fever  · You have trouble passing urine  · You see blood in your urine  · You have severe pain  · You have any questions or concerns about your condition or care  Medicines:   · NSAIDs , such as ibuprofen, help decrease swelling, pain, and fever  This medicine is available with or without a doctor's order  NSAIDs can cause stomach bleeding or kidney problems in certain people  If you take blood thinner medicine, always ask your healthcare provider if NSAIDs are safe for you  Always read the medicine label and follow directions  · Prescription medicine  may be given  Ask how to take this medicine safely  · Medicines  to balance your electrolytes may be needed  · Take your medicine as directed  Contact your healthcare provider if you think your medicine is not helping or if you have side effects  Tell him or her if you are allergic to any medicine  Keep a list of the medicines, vitamins, and herbs you take  Include the amounts, and when and why you take them  Bring the list or the pill bottles to follow-up visits  Carry your medicine list with you in case of an emergency  Follow up with your healthcare provider as directed: You may need to return for more tests  Write down your questions so you remember to ask them during your visits  Self-care:   · Drink plenty of liquids  Your healthcare provider may tell you to drink at least 8 to 12 (eight-ounce) cups of liquids each day   This helps flush out the kidney stones when you urinate  Water is the best liquid to drink  · Strain your urine every time you go to the bathroom  Urinate through a strainer or a piece of thin cloth to catch the stones  Take the stones to your healthcare provider so they can be sent to the lab for tests  This will help your healthcare providers plan the best treatment for you  · Eat a variety of healthy foods  Healthy foods include fruits, vegetables, whole-grain breads, low-fat dairy products, beans, and fish  You may need to limit how much sodium (salt) or protein you eat  Ask for information about the best foods for you  · Stay active  Your stones may pass more easily by if you stay active  Ask about the best activities for you  After you pass your kidney stones:  Once you have passed your kidney stones, your healthcare provider may  order a 24-hour urine test  Results from a 24-hour urine test will help your healthcare provider plan ways to prevent more stones from forming  If you are told to do a 24-hour test, your healthcare provider will give you more instructions  © 2017 [facility] Information is for End User's use only and may not be sold, redistributed or otherwise used for commercial purposes  All illustrations and images included in CareNotes® are the copyrighted property of [employer]  or [employer]  The above information is an  only  It is not intended as medical advice for individual conditions or treatments  Talk to your doctor, nurse or pharmacist before following any medical regimen to see if it is safe and effective for you  Kidney Stones   AMBULATORY CARE:   Kidney stones  form in the urinary system when the water and waste in your urine are out of balance  When this happens, certain types of waste crystals separate from the urine  The crystals build up and form kidney stones  Kidney stones can be made of uric acid, calcium, phosphate, or oxalate crystals   You may have 1 or more kidney stones  Common symptoms include the following:   · Pain in the middle of your back that moves across to your side or that may spread to your groin    · Nausea and vomiting    · Urge to urinate often, burning feeling when you urinate, or pink or red urine    · Tenderness in your lower back, side, or stomach  Seek care immediately if:   · You have vomiting that is not relieved by medicine  Contact your healthcare provider if:   · You have a fever  · You have trouble passing urine  · You see blood in your urine  · You have severe pain  · You have any questions or concerns about your condition or care  Treatment for kidney stones  may include any of the following:  · NSAIDs , such as ibuprofen, help decrease swelling, pain, and fever  This medicine is available with or without a doctor's order  NSAIDs can cause stomach bleeding or kidney problems in certain people  If you take blood thinner medicine, always ask your healthcare provider if NSAIDs are safe for you  Always read the medicine label and follow directions  · Prescription medicine  may be given  Ask how to take this medicine safely  · Medicines  to balance your electrolytes may be needed  · A procedure or surgery  to remove the kidney stones may be needed if they do not pass on their own  Your treatment will depend on the size and location of your kidney stones  Manage your symptoms:   · Drink plenty of liquids  Your healthcare provider may tell you to drink at least 8 to 12 (eight-ounce) cups of liquids each day  This helps flush out the kidney stones when you urinate  Water is the best liquid to drink  · Strain your urine every time you go to the bathroom  Urinate through a strainer or a piece of thin cloth to catch the stones  Take the stones to your healthcare provider so they can be sent to the lab for tests  This will help your healthcare providers plan the best treatment for you             · Eat a variety of healthy foods  Healthy foods include fruits, vegetables, whole-grain breads, low-fat dairy products, beans, and fish  You may need to limit how much sodium (salt) or protein you eat  Ask for information about the best foods for you  · Exercise regularly  Your stones may pass more easily if you stay active  Ask about the best activities for you  After you pass your kidney stones:  Once you have passed your kidney stones, your healthcare provider may  order a 24-hour urine test  Results from a 24-hour urine test will help your healthcare provider plan ways to prevent more stones from forming  If you are told to do a 24-hour test, your healthcare provider will give you more instructions  Follow up with your healthcare provider as directed:  Write down your questions so you remember to ask them during your visits  © 2017 [address] Information is for End User's use only and may not be sold, redistributed or otherwise used for commercial purposes  All illustrations and images included in CareNotes® are the copyrighted property of [employer]  or [employer]  The above information is an  only  It is not intended as medical advice for individual conditions or treatments  Talk to your doctor, nurse or pharmacist before following any medical regimen to see if it is safe and effective for you

## 2019-11-22 NOTE — ASSESSMENT & PLAN NOTE
Blood pressure on admission 201/112  Went down to 140/80 with better pain control  Will continue pain management  Hold losartan in the setting of ANAND on CKD  Continue beta-blocker and amlodipine with holding parameters

## 2019-11-22 NOTE — ASSESSMENT & PLAN NOTE
Will gradually advance diet to low-calorie if  patient tolerates p o    Currently he is NPO for abdominal pain

## 2019-11-22 NOTE — PLAN OF CARE
Problem: Potential for Falls  Goal: Patient will remain free of falls  Description  INTERVENTIONS:  - Assess patient frequently for physical needs  -  Identify cognitive and physical deficits and behaviors that affect risk of falls    -  [place] fall precautions as indicated by assessment   - Educate patient/family on patient safety including physical limitations  - Instruct patient to call for assistance with activity based on assessment  - Modify environment to reduce risk of injury  - Consider OT/PT consult to assist with strengthening/mobility  Outcome: Progressing

## 2019-11-22 NOTE — UTILIZATION REVIEW
Initial Clinical Review    Admission: Date/Time/Statement: Inpatient Admission Orders (From admission, onward)     Ordered        11/21/19 2006  Inpatient Admission (expected length of stay for this patient Order details is greater than two midnights)  Once                   Orders Placed This Encounter   Procedures    Inpatient Admission (expected length of stay for this patient Order details is greater than two midnights)     Standing Status:   Standing     Number of Occurrences:   1     Order Specific Question:   Admitting Physician     Answer:   Myra Stokes     Order Specific Question:   Level of Care     Answer:   Med Surg [16]     Order Specific Question:   Estimated length of stay     Answer:   More than 2 Midnights     Order Specific Question:   Certification     Answer:   I certify that inpatient services are medically necessary for this patient for a duration of greater than two midnights  See H&P and MD Progress Notes for additional information about the patient's course of treatment  ED Arrival Information     Expected Arrival Acuity Means of Arrival Escorted By Service Admission Type    11/21/2019 11/21/2019 15:55 Urgent Walk-In Self Hospitalist Urgent    Arrival Complaint    Lower abdominal pain        Chief Complaint   Patient presents with    Abdominal Pain     Assessment/Plan:     71year old male presents to ed from doctor office for evaluation and treatment of abdominal pain starting 2 days ago  The pain has become more intense and is intermittent  8/10  On arrival slightly tympanic  Wbc  14 06  PMHX  CKD, HTN bradycardia with pacemaker   CT shows renal stone that appears to have recently passed from right ureter to bladder  Creatinine is 2 55 (baseline 1 36 to 1 37 )  Admit to inpatient  For acute renal failure  Plan iv fluids,  Renal ultrasound,  Repeat bmp,  Monitor blood pressure, strain urine,  Defer urology evaluation          ED Triage Vitals   11/21/19 1603 11/21/19 1603 11/21/19 1603 11/21/19 1603 11/21/19 1603   97 9 °F (36 6 °C) 65 18 (!) 201/112 96 %      Oral          Pain Score       9       11/21/19 102 kg (224 lb 1 6 oz)     Additional Vital Signs:       Date/Time  Temp  Pulse  Resp  BP  MAP (mmHg)  SpO2  O2 Device   11/22/19 08:33:57  98 7 °F (37 1 °C)  60  18  136/81  99  96 %  --   11/21/19 2300  --  60  --  --  --  95 %  None (Room air)   11/21/19 2209  --  --  --  --  --  95 %  None (Room air)   11/21/19 21:21:20  99 1 °F (37 3 °C)  63  20  139/79  99  94 %  --   11/21/19 2000  --  62  --  155/93  --  96 %  None (Room air)   11/21/19 1921  --  --  --  --  --  --  None (Room air)   11/21/19 1920  --  64  18  144/84  --  96 %  None (Room air)   11/21/19 1700  --  62  --  126/81  --  96 %  None (Room air)         Pertinent Labs/Diagnostic Test Results:     CT abdomen pelvis wo contrast    (11/21 1802)      2 mm dependent bladder calculus likely recently passed from the right ureter as there is mild fat stranding surrounding and mild fullness of the right kidney and right ureter          Results from last 7 days   Lab Units 11/22/19  0735 11/21/19  1635   WBC Thousand/uL 12 70* 14 06*   HEMOGLOBIN g/dL 16 2 16 8   HEMATOCRIT % 49 5* 50 5*   PLATELETS Thousands/uL 219 203   NEUTROS ABS Thousands/µL  --  11 23*         Results from last 7 days   Lab Units 11/22/19  0735 11/21/19  1635   SODIUM mmol/L 139 135*   POTASSIUM mmol/L 4 1 4 4   CHLORIDE mmol/L 108 105   CO2 mmol/L 26 25   ANION GAP mmol/L 5 5   BUN mg/dL 22 30*   CREATININE mg/dL 1 50* 2 55*   EGFR ml/min/1 73sq m 48 25   CALCIUM mg/dL 9 0 9 2     Results from last 7 days   Lab Units 11/22/19  0735 11/21/19  1635   AST U/L 17 24   ALT U/L 29 33   ALK PHOS U/L 64 68   TOTAL PROTEIN g/dL 7 8 7 8   ALBUMIN g/dL 3 4* 3 4*   TOTAL BILIRUBIN mg/dL 1 47* 1 84*   BILIRUBIN DIRECT mg/dL 0 25*  --          Results from last 7 days   Lab Units 11/22/19  0735 11/21/19  1635   GLUCOSE RANDOM mg/dL 102 105 Results from last 7 days   Lab Units 11/21/19  1635   TROPONIN I ng/mL <0 02       Results from last 7 days   Lab Units 11/21/19  1635   LIPASE u/L 86       Results from last 7 days   Lab Units 11/21/19  1653   CLARITY UA  Slightly Cloudy   COLOR UA  [place]  -   SPEC GRAV UA  1 015   PH UA  5 5   GLUCOSE UA mg/dl Negative   KETONES UA mg/dl Negative   BLOOD UA  Large*   PROTEIN UA mg/dl 100 (2+)*   NITRITE UA  Negative   BILIRUBIN UA  Negative   UROBILINOGEN UA E U /dl 0 2   LEUKOCYTES UA  Small*   WBC UA /hpf 2-4*   RBC UA /hpf 20-30*   BACTERIA UA /hpf Occasional   EPITHELIAL CELLS WET PREP /hpf Occasional     ED Treatment:   Medication Administration from 11/21/2019 1541 to 11/21/2019 2054       Date/Time Order Dose Route Action     11/21/2019 1633 HYDROmorphone (DILAUDID) injection 0 5 mg 0 5 mg Intravenous Given     11/21/2019 1633 sodium chloride 0 9 % bolus 1,000 mL 1,000 mL Intravenous New Bag     11/21/2019 1800 HYDROmorphone (DILAUDID) injection 0 5 mg 0 5 mg Intravenous Given     11/21/2019 1945 polyethylene glycol (MIRALAX) packet 17 g 17 g Oral Given     Past Medical History:   Diagnosis    Abnormal ECG    Bradycardia    CKD (chronic kidney disease)    Dyspnea on effort    Heart block AV second degree    Hypertension    Kidney stone    Left ventricular hypertrophy    Renal insufficiency     Present on Admission:   Acute renal failure superimposed on stage 3 chronic kidney disease ([facility] [address] )   Hypertensive urgency   Abnormal CT of the abdomen   Total bilirubin, elevated   Hyponatremia   Obesity (BMI 30-39  9)      Admitting Diagnosis: Abdominal pain [R10 9]  Age/Sex: 71 y o  male     Admission Orders:    Scheduled Medications:      Pain  9/10 5/10  7/10 8/10    Iv dilaudid 0 5  1633 and 1800 11-21     Iv mso4 15 mg 2139 11-21  Iv mso4 30 mg 0232 11-22     Medications:  amLODIPine 5 mg Oral Daily   cefTRIAXone 1,000 mg Intravenous Q24H   docusate sodium 100 mg Oral BID   heparin (porcine) 5,000 Units Subcutaneous Q8H [address]   metoprolol succinate 50 mg Oral Daily   senna 1 tablet Oral Daily     Continuous IV Infusions:      sodium chloride 0 9 % infusion   Rate: 90 mL/hr Dose: 90 mL/hr  Freq: Continuous Route: IV  Last Dose: Stopped (11/22/19 9628)  Start: 11/21/19 2045 End: 11/22/19 3346      PRN Meds:    acetaminophen 650 mg Oral Q6H PRN   morphine 15 mg Oral Q4H PRN   morphine 30 mg Oral Q6H PRN   ondansetron 4 mg Intravenous Q6H PRN   polyethylene glycol 17 g Oral Daily PRN       None    Network Utilization Review Department  [email or web address]  ATTENTION: Please call with any questions or concerns to [phone] and carefully listen to the prompts so that you are directed to the right person  All voicemails are confidential   Francine Mccray all requests for admission clinical reviews, approved or denied determinations and any other requests to dedicated fax number below belonging to the campus where the patient is receiving treatment    FACILITY NAME UR FAX NUMBER   ADMISSION DENIALS (Administrative/Medical Necessity) [address] [facility] (Maternity/NICU/Pediatrics) [phone]   [facility] [address] [facility] [phone]   [facility] [address] [facility] [phone]   Mariana Santamaria [address] [facility] 500 [facility] [phone]

## 2019-11-22 NOTE — DISCHARGE SUMMARY
Discharge Summary - [facility]    Patient Information: Cori Noriega 71 y o  male MRN: [id number]  Unit/Bed#: -01 Encounter: [id number]    Discharging Physician / Practitioner: Esme Lombardi DO  PCP: Arthur Ly MD  Admission Date: 11/21/2019  Discharge Date: 11/22/19    Reason for Admission: abd pain    Discharge Diagnoses:     Principal Problem (Resolved):    Acute renal failure superimposed on stage 3 chronic kidney disease ([address] )  Active Problems:    History of pacemaker    Total bilirubin, elevated    Obesity (BMI 30-39  9)  Resolved Problems:    Hypertensive urgency    Abnormal CT of the abdomen    Hyponatremia    HPI: Cori Noriega is a 71 y o  male with history of nephrolithiasis ,CKD 3 , dual pacemaker who was referred by his PCP for further evaluation of abdominal pain ,decreased oral intake  Upon my assessment patient endorses 2 days of decreased oral intake and suprapubic sharp, 10/10  pain radiating to the epigastrium similar to his previous presentation when he was diagnosed with kidney stones  He also endorses 2-3 liquid bowel movements since he was given laxative in the ER due to 2 days of constipation  He denies nausea vomiting  sinan hematuria fever chills  CT of abdomen and pelvis reported "2 mm dependent bladder calculus likely recently passed from the right ureter as there is mild fat stranding surrounding and mild fullness of the right kidney and right ureter "  The significant findings is getting worse creatinine of 2 55 compared to 1 36 on Oct 5, 2018  Urinalysis with no significant findings pyuria or bacteriuria, RBC 20-30    Patient afebrile with elevated WBC of 14 6   TB 1 84  Patient will require IV hydration, short course of antibiotic, admitted to the hospitalist service on inpatient basis  Consultations During Hospital Stay:  · None    Procedures Performed:     · CT a/p    Significant Findings:     · Refer to hospital course    Incidental Findings: · none    Test Results Pending at Discharge (will require follow up):   ·      Outpatient Tests Requested:  · BMP in 1 week    Complications:      Hospital Course:     # ANAND on CKD stg III secondary to renal calculi  - quick improvement s/p aggressive IV hydration  - cr trended down to baseline range  - repeat bmp in 1 week    # Renal calculi, non obstructing  - s/p ct showing recently passed from rt ureteral to bladder  - abd pain essentially resolved; diet resumed w/o tolerance; also resolved RT flank pain  - Likely pt will pass stone on his own  - Will d/c on flomax, prn pain analgesics and I have advised pt to strain his urin  - encouraged contd fluid hydration    # Leukocytosis likely reactive, trended down, U/a w/o pyuria thus no need for abx    # HTN urgency - resolved    # Hyponatremia - secondary to hypovolema resolved    # Obesity - dietary and lifestyle modificaiton    Disp: Stable for d/c to home, patient had quick improvement  Plan of care discussed with patient at length  Condition at Discharge: stable     Discharge Day Visit / Exam:     Subjective:  Pt feels well, abd essentially resolved; noted mild discomfort  Afebrile  Vitals: Blood Pressure: 136/81 (11/22/19 0833)  Pulse: 60 (11/22/19 0833)  Temperature: 98 7 °F (37 1 °C) (11/22/19 0833)  Temp Source: Oral (11/21/19 1603)  Respirations: 18 (11/22/19 0946)  Height: 6' (182 9 cm) (11/21/19 2121)  Weight - Scale: 102 kg (224 lb 1 6 oz) (11/21/19 2121)  SpO2: 96 % (11/22/19 3295)  Exam:   Physical Exam   Constitutional: He is oriented to person, place, and time  He appears well-developed  obese   Neck: Neck supple  Cardiovascular: Normal rate, regular rhythm, normal heart sounds and intact distal pulses  Exam reveals no friction rub  No murmur heard  Pulmonary/Chest: Effort normal and breath sounds normal  No stridor  No respiratory distress  He has no wheezes  He has no rales  He exhibits no tenderness  Abdominal: Soft   Bowel sounds are normal  He exhibits no distension  There is no tenderness  There is no rebound and no guarding  Genitourinary:   Genitourinary Comments: Neg RT CVA tenderness   Musculoskeletal: Normal range of motion  He exhibits no edema or deformity  Neurological: He is alert and oriented to person, place, and time  He displays normal reflexes  No cranial nerve deficit or sensory deficit  He exhibits normal muscle tone  Coordination normal    Skin: Skin is warm and dry  Discharge instructions/Information to patient and family:   See after visit summary for information provided to patient and family  Provisions for Follow-Up Care:  See after visit summary for information related to follow-up care and any pertinent home health orders  Disposition:     Home    For Discharges to [facility] SNF:   · Not Applicable to this Patient - Not Applicable to this Patient    Planned Readmission: No      Discharge Statement:  I spent > 30 minutes discharging the patient  This time was spent on the day of discharge  I had direct contact with the patient on the day of discharge  Greater than 50% of the total time was spent examining patient, answering all patient questions, arranging and discussing plan of care with patient as well as directly providing post-discharge instructions  Additional time then spent on discharge activities  Discharge Medications:  See after visit summary for reconciled discharge medications provided to patient and family  ** Please Note: Dragon 360 Dictation voice to text software may have been used in the creation of this document   **

## 2019-11-22 NOTE — ASSESSMENT & PLAN NOTE
A year ago creatinine was 1 36-1 37 ( 1 36 on Oct 5, 2018) today creatinine 2 55, most likely secondary to passing stone, volume depletion dehydration decreased oral intake in the setting of ARB use  Will provide IV hydration repeat BMP in a m    Hold losartan  Check renal U/S  Avoid nephrotoxin drugs and hypotension

## 2019-11-22 NOTE — ASSESSMENT & PLAN NOTE
Mildly decreased sodium level most likely secondary to dehydration and pain  Continue IV fluids overnight  Re-evaluate need of normal saline in a m  Repeat BMP in a m

## 2019-11-25 ENCOUNTER — TRANSITIONAL CARE MANAGEMENT (OUTPATIENT)
Dept: FAMILY MEDICINE CLINIC | Facility: CLINIC | Age: 64
End: 2019-11-25

## 2019-12-27 ENCOUNTER — REMOTE DEVICE CLINIC VISIT (OUTPATIENT)
Dept: CARDIOLOGY CLINIC | Facility: CLINIC | Age: 64
End: 2019-12-27
Payer: COMMERCIAL

## 2019-12-27 DIAGNOSIS — Z95.0 PACEMAKER: Primary | ICD-10-CM

## 2019-12-27 PROCEDURE — 93294 REM INTERROG EVL PM/LDLS PM: CPT | Performed by: INTERNAL MEDICINE

## 2019-12-27 PROCEDURE — 93296 REM INTERROG EVL PM/IDS: CPT | Performed by: INTERNAL MEDICINE

## 2020-02-17 ENCOUNTER — OFFICE VISIT (OUTPATIENT)
Dept: CARDIOLOGY CLINIC | Facility: CLINIC | Age: 65
End: 2020-02-17
Payer: COMMERCIAL

## 2020-02-17 VITALS
SYSTOLIC BLOOD PRESSURE: 140 MMHG | OXYGEN SATURATION: 94 % | WEIGHT: 233 LBS | HEART RATE: 70 BPM | BODY MASS INDEX: 31.56 KG/M2 | DIASTOLIC BLOOD PRESSURE: 80 MMHG | RESPIRATION RATE: 18 BRPM | HEIGHT: 72 IN

## 2020-02-17 DIAGNOSIS — I10 ESSENTIAL HYPERTENSION: ICD-10-CM

## 2020-02-17 DIAGNOSIS — I47.1 SVT (SUPRAVENTRICULAR TACHYCARDIA) (HCC): ICD-10-CM

## 2020-02-17 DIAGNOSIS — I45.9 HEART BLOCK: Primary | ICD-10-CM

## 2020-02-17 PROCEDURE — 3077F SYST BP >= 140 MM HG: CPT | Performed by: INTERNAL MEDICINE

## 2020-02-17 PROCEDURE — 1160F RVW MEDS BY RX/DR IN RCRD: CPT | Performed by: INTERNAL MEDICINE

## 2020-02-17 PROCEDURE — 3079F DIAST BP 80-89 MM HG: CPT | Performed by: INTERNAL MEDICINE

## 2020-02-17 PROCEDURE — 99213 OFFICE O/P EST LOW 20 MIN: CPT | Performed by: INTERNAL MEDICINE

## 2020-02-17 PROCEDURE — 3008F BODY MASS INDEX DOCD: CPT | Performed by: INTERNAL MEDICINE

## 2020-02-17 NOTE — ASSESSMENT & PLAN NOTE
History of supraventricular tachycardia, stable  The patient is in regular rhythm  We will continue metoprolol succinate ER 50 mg daily

## 2020-02-17 NOTE — PATIENT INSTRUCTIONS
The patient is advised to continue the same medications  He will continue to monitor his blood pressure at home

## 2020-02-17 NOTE — LETTER
February 17, 9600     Keith Oconnor [address] [address]    Patient: Petros Bee   YOB: 1955   Date of Visit: 2/17/2020       Dear Dr Zuly Garcia: Thank you for referring Iban Saab to me for evaluation  Below are my notes for this consultation  If you have questions, please do not hesitate to call me  I look forward to following your patient along with you  Sincerely,        Ed Johns MD        CC: No Recipients  Ed Johns MD  2/17/2020  4:01 PM  Incomplete  Assessment/Plan:    Hypertension  Hypertension, higher than desirable today  Patient however monitor his blood pressure at home and has been satisfactory  We will continue present regimen of medications  Heart block  History of heart block with permanent pacemaker  This has been stable  The patient is asymptomatic  SVT (supraventricular tachycardia) (HCC)  History of supraventricular tachycardia, stable  The patient is in regular rhythm  We will continue metoprolol succinate ER 50 mg daily  Diagnoses and all orders for this visit:    Heart block    Essential hypertension    SVT (supraventricular tachycardia) (HCC)          Subjective:  Feels well  Patient ID: Petros Bee is a 71 y o  male  The patient presented to this office for the purpose of cardiac  He has a known history of heart block status post permanent pacemaker  He also has history of supraventricular tachycardia and hypertension  He has been feeling rather well denying any symptoms of chest pain, shortness of breath palpitation, dizziness or lightheadedness  Has no leg edema  The following portions of the patient's history were reviewed and updated as appropriate: allergies, current medications, past family history, past medical history, past social history, past surgical history and problem list     Review of Systems   Respiratory: Negative for apnea, cough, chest tightness, shortness of breath and wheezing  Cardiovascular: Negative for chest pain, palpitations and leg swelling  Gastrointestinal: Negative for abdominal pain  Neurological: Negative for dizziness and light-headedness  Hematological: Negative  Psychiatric/Behavioral: Negative  Objective:  Stable cardiac-wise  /80 (BP Location: Left arm, Patient Position: Sitting)   Pulse 70   Resp 18   Ht 6' (1 829 m)   Wt 106 kg (233 lb)   SpO2 94%   BMI 31 60 kg/m²           Physical Exam   Constitutional: He is oriented to person, place, and time  He appears well-developed and well-nourished  No distress  HENT:   Head: Normocephalic  Eyes: Pupils are equal, round, and reactive to light  Neck: Normal range of motion  No JVD present  No thyromegaly present  Cardiovascular: Normal rate, regular rhythm, S1 normal and S2 normal  Exam reveals no gallop and no friction rub  Murmur heard  Crescendo systolic murmur is present with a grade of 2/6  Pulmonary/Chest: Effort normal and breath sounds normal  No respiratory distress  He has no wheezes  He has no rales  He exhibits no tenderness  Abdominal: Soft  Musculoskeletal: Normal range of motion  He exhibits no edema, tenderness or deformity  Neurological: He is alert and oriented to person, place, and time  Skin: Skin is warm and dry  He is not diaphoretic  Psychiatric: He has a normal mood and affect  Vitals reviewed  Aurora Moscoso MD  2/17/2020  4:00 PM  Sign at close encounter  Assessment/Plan:    Hypertension  Hypertension, higher than desirable today  Patient however monitor his blood pressure at home and has been satisfactory  We will continue present regimen of medications  Heart block  History of heart block with permanent pacemaker  This has been stable  The patient is asymptomatic  SVT (supraventricular tachycardia) (HCC)  History of supraventricular tachycardia, stable  The patient is in regular rhythm    We will continue metoprolol succinate ER 50 mg daily  Diagnoses and all orders for this visit:    Heart block    Essential hypertension    SVT (supraventricular tachycardia) (HCC)          Subjective:  Feels well  Patient ID: Chapis Douglass is a 71 y o  male  The patient presented to this office for the purpose of cardiac  He has a known history of heart block status post permanent pacemaker  He also has history of supraventricular tachycardia and hypertension  He has been feeling rather well denying any symptoms of chest pain, shortness of breath palpitation, dizziness or lightheadedness  Has no leg edema  The following portions of the patient's history were reviewed and updated as appropriate: allergies, current medications, past family history, past medical history, past social history, past surgical history and problem list     Review of Systems   Respiratory: Negative for apnea, cough, chest tightness, shortness of breath and wheezing  Cardiovascular: Negative for chest pain, palpitations and leg swelling  Gastrointestinal: Negative for abdominal pain  Neurological: Negative for dizziness and light-headedness  Hematological: Negative  Psychiatric/Behavioral: Negative  Objective:  Stable cardiac-wise  /80 (BP Location: Left arm, Patient Position: Sitting)   Pulse 70   Resp 18   Ht 6' (1 829 m)   Wt 106 kg (233 lb)   SpO2 94%   BMI 31 60 kg/m²           Physical Exam   Constitutional: He is oriented to person, place, and time  He appears well-developed and well-nourished  No distress  HENT:   Head: Normocephalic  Eyes: Pupils are equal, round, and reactive to light  Neck: Normal range of motion  No JVD present  No thyromegaly present  Cardiovascular: Normal rate, regular rhythm, S1 normal and S2 normal  Exam reveals no gallop and no friction rub  Murmur heard  Crescendo systolic murmur is present with a grade of 2/6    Pulmonary/Chest: Effort normal and breath sounds normal  No respiratory distress  He has no wheezes  He has no rales  He exhibits no tenderness  Abdominal: Soft  Musculoskeletal: Normal range of motion  He exhibits no edema, tenderness or deformity  Neurological: He is alert and oriented to person, place, and time  Skin: Skin is warm and dry  He is not diaphoretic  Psychiatric: He has a normal mood and affect  Vitals reviewed

## 2020-02-17 NOTE — PROGRESS NOTES
Assessment/Plan:    Hypertension  Hypertension, higher than desirable today  Patient however monitor his blood pressure at home and has been satisfactory  We will continue present regimen of medications  Heart block  History of heart block with permanent pacemaker  This has been stable  The patient is asymptomatic  SVT (supraventricular tachycardia) (HCC)  History of supraventricular tachycardia, stable  The patient is in regular rhythm  We will continue metoprolol succinate ER 50 mg daily  Diagnoses and all orders for this visit:    Heart block    Essential hypertension    SVT (supraventricular tachycardia) (HCC)          Subjective:  Feels well  Patient ID: Darshan Fraser is a 71 y o  male  The patient presented to this office for the purpose of cardiac  He has a known history of heart block status post permanent pacemaker  He also has history of supraventricular tachycardia and hypertension  He has been feeling rather well denying any symptoms of chest pain, shortness of breath palpitation, dizziness or lightheadedness  Has no leg edema  The following portions of the patient's history were reviewed and updated as appropriate: allergies, current medications, past family history, past medical history, past social history, past surgical history and problem list     Review of Systems   Respiratory: Negative for apnea, cough, chest tightness, shortness of breath and wheezing  Cardiovascular: Negative for chest pain, palpitations and leg swelling  Gastrointestinal: Negative for abdominal pain  Neurological: Negative for dizziness and light-headedness  Hematological: Negative  Psychiatric/Behavioral: Negative  Objective:  Stable cardiac-wise        /80 (BP Location: Left arm, Patient Position: Sitting)   Pulse 70   Resp 18   Ht 6' (1 829 m)   Wt 106 kg (233 lb)   SpO2 94%   BMI 31 60 kg/m²          Physical Exam   Constitutional: He is oriented to person, place, and time  He appears well-developed and well-nourished  No distress  HENT:   Head: Normocephalic  Eyes: Pupils are equal, round, and reactive to light  Neck: Normal range of motion  No JVD present  No thyromegaly present  Cardiovascular: Normal rate, regular rhythm, S1 normal and S2 normal  Exam reveals no gallop and no friction rub  Murmur heard  Crescendo systolic murmur is present with a grade of 2/6  Pulmonary/Chest: Effort normal and breath sounds normal  No respiratory distress  He has no wheezes  He has no rales  He exhibits no tenderness  Abdominal: Soft  Musculoskeletal: Normal range of motion  He exhibits no edema, tenderness or deformity  Neurological: He is alert and oriented to person, place, and time  Skin: Skin is warm and dry  He is not diaphoretic  Psychiatric: He has a normal mood and affect  Vitals reviewed

## 2020-02-17 NOTE — ASSESSMENT & PLAN NOTE
Hypertension, higher than desirable today  Patient however monitor his blood pressure at home and has been satisfactory  We will continue present regimen of medications

## 2020-05-08 ENCOUNTER — REMOTE DEVICE CLINIC VISIT (OUTPATIENT)
Dept: CARDIOLOGY CLINIC | Facility: CLINIC | Age: 65
End: 2020-05-08
Payer: COMMERCIAL

## 2020-05-08 DIAGNOSIS — Z95.0 PRESENCE OF PERMANENT CARDIAC PACEMAKER: Primary | ICD-10-CM

## 2020-05-08 PROCEDURE — 93296 REM INTERROG EVL PM/IDS: CPT | Performed by: INTERNAL MEDICINE

## 2020-05-08 PROCEDURE — 93294 REM INTERROG EVL PM/LDLS PM: CPT | Performed by: INTERNAL MEDICINE

## 2020-05-27 DIAGNOSIS — I47.1 SVT (SUPRAVENTRICULAR TACHYCARDIA) (HCC): ICD-10-CM

## 2020-05-27 RX ORDER — METOPROLOL SUCCINATE 50 MG/1
TABLET, EXTENDED RELEASE ORAL
Qty: 30 TABLET | Refills: 6 | Status: SHIPPED | OUTPATIENT
Start: 2020-05-27 | End: 2020-05-29

## 2020-05-29 DIAGNOSIS — I47.1 SVT (SUPRAVENTRICULAR TACHYCARDIA) (HCC): ICD-10-CM

## 2020-05-29 RX ORDER — METOPROLOL SUCCINATE 50 MG/1
TABLET, EXTENDED RELEASE ORAL
Qty: 30 TABLET | Refills: 6 | Status: SHIPPED | OUTPATIENT
Start: 2020-05-29 | End: 2021-06-01

## 2020-06-29 ENCOUNTER — HOSPITAL ENCOUNTER (OUTPATIENT)
Facility: HOSPITAL | Age: 65
Setting detail: OBSERVATION
Discharge: HOME | End: 2020-06-30
Attending: EMERGENCY MEDICINE | Admitting: FAMILY MEDICINE
Payer: COMMERCIAL

## 2020-06-29 ENCOUNTER — APPOINTMENT (EMERGENCY)
Dept: RADIOLOGY | Facility: HOSPITAL | Age: 65
End: 2020-06-29
Payer: COMMERCIAL

## 2020-06-29 DIAGNOSIS — R06.02 SOB (SHORTNESS OF BREATH): ICD-10-CM

## 2020-06-29 DIAGNOSIS — R07.9 CHEST PAIN: Primary | ICD-10-CM
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
## 2020-06-29 PROBLEM — N18.9 CHRONIC KIDNEY DISEASE: Status: ACTIVE | Noted: 2020-06-29

## 2020-06-29 LAB
ALBUMIN SERPL BCP-MCNC: 3.7 G/DL (ref 3.5–5)
ALP SERPL-CCNC: 78 U/L (ref 46–116)
ALT SERPL W P-5'-P-CCNC: 56 U/L (ref 12–78)
ANION GAP SERPL CALCULATED.3IONS-SCNC: 6 MMOL/L (ref 4–13)
AST SERPL W P-5'-P-CCNC: 30 U/L (ref 5–45)
ATRIAL RATE: 59 BPM
ATRIAL RATE: 60 BPM
BASOPHILS # BLD AUTO: 0.08 THOUSANDS/ΜL (ref 0–0.1)
BASOPHILS NFR BLD AUTO: 1 % (ref 0–1)
BILIRUB SERPL-MCNC: 0.87 MG/DL (ref 0.2–1)
BUN SERPL-MCNC: 21 MG/DL (ref 5–25)
CALCIUM SERPL-MCNC: 8.9 MG/DL (ref 8.3–10.1)
CHLORIDE SERPL-SCNC: 107 MMOL/L (ref 100–108)
CHOLEST SERPL-MCNC: 181 MG/DL (ref 50–200)
CO2 SERPL-SCNC: 27 MMOL/L (ref 21–32)
CREAT SERPL-MCNC: 1.41 MG/DL (ref 0.6–1.3)
D DIMER PPP FEU-MCNC: 0.34 UG/ML FEU
EOSINOPHIL # BLD AUTO: 0.19 THOUSAND/ΜL (ref 0–0.61)
EOSINOPHIL NFR BLD AUTO: 3 % (ref 0–6)
ERYTHROCYTE [DISTWIDTH] IN BLOOD BY AUTOMATED COUNT: 12.8 % (ref 11.6–15.1)
GFR SERPL CREATININE-BSD FRML MDRD: 52 ML/MIN/1.73SQ M
GLUCOSE SERPL-MCNC: 101 MG/DL (ref 65–140)
HCT VFR BLD AUTO: 49.5 % (ref 36.5–49.3)
HDLC SERPL-MCNC: 40 MG/DL
HGB BLD-MCNC: 16.5 G/DL (ref 12–17)
IMM GRANULOCYTES # BLD AUTO: 0.03 THOUSAND/UL (ref 0–0.2)
IMM GRANULOCYTES NFR BLD AUTO: 0 % (ref 0–2)
LDLC SERPL CALC-MCNC: 107 MG/DL (ref 0–100)
LYMPHOCYTES # BLD AUTO: 1.46 THOUSANDS/ΜL (ref 0.6–4.47)
LYMPHOCYTES NFR BLD AUTO: 20 % (ref 14–44)
MCH RBC QN AUTO: 30.4 PG (ref 26.8–34.3)
MCHC RBC AUTO-ENTMCNC: 33.3 G/DL (ref 31.4–37.4)
MCV RBC AUTO: 91 FL (ref 82–98)
MONOCYTES # BLD AUTO: 0.62 THOUSAND/ΜL (ref 0.17–1.22)
MONOCYTES NFR BLD AUTO: 8 % (ref 4–12)
NEUTROPHILS # BLD AUTO: 5.03 THOUSANDS/ΜL (ref 1.85–7.62)
NEUTS SEG NFR BLD AUTO: 68 % (ref 43–75)
NONHDLC SERPL-MCNC: 141 MG/DL
NRBC BLD AUTO-RTO: 0 /100 WBCS
P AXIS: -22 DEGREES
P AXIS: 60 DEGREES
PLATELET # BLD AUTO: 242 THOUSANDS/UL (ref 149–390)
PMV BLD AUTO: 9.5 FL (ref 8.9–12.7)
POTASSIUM SERPL-SCNC: 4.3 MMOL/L (ref 3.5–5.3)
PR INTERVAL: 176 MS
PR INTERVAL: 186 MS
PROT SERPL-MCNC: 7.7 G/DL (ref 6.4–8.2)
QRS AXIS: -63 DEGREES
QRS AXIS: -65 DEGREES
QRSD INTERVAL: 154 MS
QRSD INTERVAL: 160 MS
QT INTERVAL: 450 MS
QT INTERVAL: 458 MS
QTC INTERVAL: 445 MS
QTC INTERVAL: 458 MS
RBC # BLD AUTO: 5.42 MILLION/UL (ref 3.88–5.62)
SARS-COV-2 RNA RESP QL NAA+PROBE: NEGATIVE
SODIUM SERPL-SCNC: 140 MMOL/L (ref 136–145)
T WAVE AXIS: 100 DEGREES
T WAVE AXIS: 97 DEGREES
TRIGL SERPL-MCNC: 172 MG/DL
TROPONIN I SERPL-MCNC: <0.02 NG/ML
VENTRICULAR RATE: 59 BPM
VENTRICULAR RATE: 60 BPM
WBC # BLD AUTO: 7.41 THOUSAND/UL (ref 4.31–10.16)

## 2020-06-29 PROCEDURE — 85379 FIBRIN DEGRADATION QUANT: CPT | Performed by: EMERGENCY MEDICINE

## 2020-06-29 PROCEDURE — 80053 COMPREHEN METABOLIC PANEL: CPT | Performed by: EMERGENCY MEDICINE

## 2020-06-29 PROCEDURE — 99285 EMERGENCY DEPT VISIT HI MDM: CPT

## 2020-06-29 PROCEDURE — 93005 ELECTROCARDIOGRAM TRACING: CPT

## 2020-06-29 PROCEDURE — 93010 ELECTROCARDIOGRAM REPORT: CPT | Performed by: INTERNAL MEDICINE

## 2020-06-29 PROCEDURE — 84484 ASSAY OF TROPONIN QUANT: CPT | Performed by: FAMILY MEDICINE

## 2020-06-29 PROCEDURE — 71045 X-RAY EXAM CHEST 1 VIEW: CPT

## 2020-06-29 PROCEDURE — 99220 PR INITIAL OBSERVATION CARE/DAY 70 MINUTES: CPT | Performed by: FAMILY MEDICINE

## 2020-06-29 PROCEDURE — 36415 COLL VENOUS BLD VENIPUNCTURE: CPT

## 2020-06-29 PROCEDURE — 85025 COMPLETE CBC W/AUTO DIFF WBC: CPT | Performed by: EMERGENCY MEDICINE

## 2020-06-29 PROCEDURE — 99285 EMERGENCY DEPT VISIT HI MDM: CPT | Performed by: EMERGENCY MEDICINE

## 2020-06-29 PROCEDURE — 86618 LYME DISEASE ANTIBODY: CPT | Performed by: FAMILY MEDICINE

## 2020-06-29 PROCEDURE — U0003 INFECTIOUS AGENT DETECTION BY NUCLEIC ACID (DNA OR RNA); SEVERE ACUTE RESPIRATORY SYNDROME CORONAVIRUS 2 (SARS-COV-2) (CORONAVIRUS DISEASE [COVID-19]), AMPLIFIED PROBE TECHNIQUE, MAKING USE OF HIGH THROUGHPUT TECHNOLOGIES AS DESCRIBED BY CMS-2020-01-R: HCPCS | Performed by: EMERGENCY MEDICINE

## 2020-06-29 PROCEDURE — 80061 LIPID PANEL: CPT | Performed by: FAMILY MEDICINE

## 2020-06-29 PROCEDURE — 84484 ASSAY OF TROPONIN QUANT: CPT | Performed by: EMERGENCY MEDICINE

## 2020-06-29 RX ORDER — NICOTINE 21 MG/24HR
1 PATCH, TRANSDERMAL 24 HOURS TRANSDERMAL DAILY
Status: DISCONTINUED | OUTPATIENT
Start: 2020-06-29 | End: 2020-06-30 | Stop reason: HOSPADM

## 2020-06-29 RX ORDER — OXYCODONE HYDROCHLORIDE 5 MG/1
2.5 TABLET ORAL EVERY 4 HOURS PRN
Status: DISCONTINUED | OUTPATIENT
Start: 2020-06-29 | End: 2020-06-30 | Stop reason: HOSPADM

## 2020-06-29 RX ORDER — AMLODIPINE BESYLATE 5 MG/1
5 TABLET ORAL DAILY
Status: DISCONTINUED | OUTPATIENT
Start: 2020-06-30 | End: 2020-06-30 | Stop reason: HOSPADM

## 2020-06-29 RX ORDER — ACETAMINOPHEN 325 MG/1
650 TABLET ORAL EVERY 6 HOURS PRN
Status: DISCONTINUED | OUTPATIENT
Start: 2020-06-29 | End: 2020-06-30 | Stop reason: HOSPADM

## 2020-06-29 RX ORDER — ASPIRIN 81 MG/1
81 TABLET ORAL DAILY
Status: DISCONTINUED | OUTPATIENT
Start: 2020-06-29 | End: 2020-06-30 | Stop reason: HOSPADM

## 2020-06-29 RX ORDER — METOPROLOL SUCCINATE 50 MG/1
50 TABLET, EXTENDED RELEASE ORAL DAILY
Status: DISCONTINUED | OUTPATIENT
Start: 2020-06-30 | End: 2020-06-30 | Stop reason: HOSPADM

## 2020-06-29 RX ORDER — LOSARTAN POTASSIUM 50 MG/1
100 TABLET ORAL DAILY
Status: DISCONTINUED | OUTPATIENT
Start: 2020-06-30 | End: 2020-06-30 | Stop reason: HOSPADM

## 2020-06-29 RX ORDER — NITROGLYCERIN 0.4 MG/1
0.4 TABLET SUBLINGUAL
Status: DISCONTINUED | OUTPATIENT
Start: 2020-06-29 | End: 2020-06-30 | Stop reason: HOSPADM

## 2020-06-29 RX ADMIN — NITROGLYCERIN 0.5 INCH: 20 OINTMENT TOPICAL at 09:49

## 2020-06-29 RX ADMIN — ASPIRIN 81 MG: 81 TABLET, COATED ORAL at 13:51

## 2020-06-29 NOTE — H&P
Expand All Collapse All      Show:Clear all  [x]Manual[x]Template[]Copied    Added by:  Deepti Beach MD    []Adolphver for details        History  And physical - Kristian Stockton 1955, 71 y o  male MRN: [id number]     Unit/Bed#: [facility] 728-01 Encounter: [id number]     Primary Care Provider: Yury Medellin MD   Date and time admitted to hospital: 6/29/2020  8:44 AM           * Chest pain  Assessment & Plan  · Patient came to the hospital with intermittent chest pain  History is suggestive of atypical chest pain  · No relationship with activity  · Patient appears to be very anxious and seems to be worked up regarding the pain  · Had stress test in 2007  · WILFREDO score 3  · Troponin x3  · Followed by Dinora Roque as outpatient     Tick bite  Assessment & Plan  · Patient reported that he had tick bite 4 weeks ago and he is worried that he has Lyme disease and some of the symptoms may be a manifestation of his Lyme disease  · No fever or any joint pain  · Obtain a Lyme titer-patient aware that the results is not going to be back by the time he is discharged and needs outpatient follow-up with the PCP     Chronic kidney disease  Assessment & Plan  · Creatinine 1 41  · Appears to be at baseline     History of pacemaker  Assessment & Plan  · Patient had pacemaker placement in 2017 for heart block  · Pacer interrogated in 05/2020-     Hypertension  Assessment & Plan  · Patient is on Norvasc ,metoprolol and losartan as an outpatient  · Continue with the outpatient blood pressure medication  · Monitor blood pressure  · Appears to be at acceptable range        VTE Prophylaxis: Length of  the stay less than 24 hours  / sequential compression device   Code Status:  Full code  POLST: POLST form is not discussed and not completed at this time  Discussion with family:  Patient     Anticipated Length of Stay:  Patient will be admitted on an Observation basis with an anticipated length of stay of  less 2 midnights     Justification for Hospital Stay:  Chest pain     Total Time for Visit, including Counseling / Coordination of Care: 1 hour  Greater than 50% of this total time spent on direct patient counseling and coordination of care      Chief Complaint:   Chest pain     History of Present Illness:     Alonzo Gomez is a 71 y o  male who presents with intermittent chest pain  Chest pain is mainly located in the precordial region and this has been going on for about a week  Patient denies any definite relationship with activity and chest pain  Pain is usually very short-lived last for minutes and resolved spontaneously  Patient reported that he feels more tired than usual   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 and gets easily tired but denies any chest pain with his activity  Patient does not exercise on a regular basis and he considered his yd work as his exercise  Denies any shortness of breath  Denies any palpitation  Denies any nausea vomiting diarrhea  Patient appears to be very anxious and asking multiple questions like whether he has a blockage in his arteries  Patient also gives history of right lower extremity swelling since he noted since Saturday  Patient is very worried that he has a blood clot in his leg and I explained to him that his D-dimer is negative which is a very sensitive test for blood clot and unlikely he is going to have a blood clot anywhere in his body with a negative D-dimer  Patient also worried that his symptoms may be related to Lyme disease since he had a tick bite couple of weeks ago in his head  He has not noticed any fever or any chills  Has not had any rash     Review of Systems:     Review of Systems   Constitutional: Positive for fatigue  Negative for activity change and appetite change  HENT: Negative  Eyes: Negative  Respiratory: Negative  Cardiovascular: Positive for chest pain  Gastrointestinal: Negative for abdominal distention and abdominal pain     Genitourinary: Negative  Musculoskeletal: Negative  Neurological: Positive for light-headedness and numbness (Intermittent by and numbness of bilateral lower extremity)  Hematological: Negative  Psychiatric/Behavioral: Negative           Past Medical and Surgical History:      Medical History        Past Medical History:   Diagnosis Date    Abnormal ECG      Bradycardia      CKD (chronic kidney disease)      Dyspnea on effort      Heart block AV second degree      Hypertension      Kidney stone      Left ventricular hypertrophy      Renal insufficiency              Surgical History         Past Surgical History:   Procedure Laterality Date    CARDIAC PACEMAKER PLACEMENT   2017            Meds/Allergies:             Prior to Admission medications    Medication Sig Start Date End Date Taking?  Authorizing Provider   amLODIPine (NORVASC) 5 mg tablet TAKE 1 TABLET BY MOUTH DAILY 10/31/19   Yes Bulmaro Galloway MD   losartan (COZAAR) 100 MG tablet TAKE 1 TABLET BY MOUTH DAILY 10/31/19   Yes Bulmaro Galloway MD   metoprolol succinate (TOPROL-XL) 50 mg 24 hr tablet TAKE ONE TABLET BY MOUTH EVERY DAY 5/29/20   Yes Bulmaro Galloway MD   acetaminophen (TYLENOL) 325 mg tablet Take 2 tablets (650 mg total) by mouth every 6 (six) hours as needed for mild pain  Patient not taking: Reported on 2/17/2020 11/22/19     Kristen Maloney,    tamsulosin [facility]) 0 4 mg Take 1 capsule (0 4 mg total) by mouth daily with dinner 11/22/19 12/22/19   Kristen Maloney, DO   docusate sodium (COLACE) 100 mg capsule Take 1 capsule (100 mg total) by mouth 2 (two) times a day as needed for constipation  Patient not taking: Reported on 2/17/2020 11/22/19 6/29/20   Kristen Maloney,    oxyCODONE (ROXICODONE) 5 mg immediate release tablet Take 1 tablet (5 mg total) by mouth every 6 (six) hours as needed for severe painMax Daily Amount: 20 mg  Patient not taking: Reported on 2/17/2020 11/22/19 6/29/20   Kristen Maloney, DO      I have reviewed home medications with patient personally      Allergies: Allergies   Allergen Reactions    Bactrim [Sulfamethoxazole-Trimethoprim] Hives    Iv Contrast [Iodinated Diagnostic Agents]           Social History:     Marital Status: /Civil Union   Occupation:  Works a sedentary job-[employer]  Patient Pre-hospital Living Situation:  Lives at home with family  Patient Pre-hospital Level of Mobility:  Independent  Patient Pre-hospital Diet Restrictions:  Substance Use History:   Social History           Substance and Sexual Activity   Alcohol Use Never    Frequency: Never    Drinks per session: Patient refused    Binge frequency: Never      Social History           Tobacco Use   Smoking Status Current Some Day Smoker    Types: Cigars   Smokeless Tobacco Never Used   Tobacco Comment     1 a week      Social History          Substance and Sexual Activity   Drug Use No         Family History:           Family History   Problem Relation Age of Onset    Hypertension Mother      Heart attack Father           Physical Exam:      Vitals:   Blood Pressure: 124/84 (06/29/20 1251)  Pulse: 66 (06/29/20 1251)  Temperature: 98 2 °F (36 8 °C) (06/29/20 1251)  Temp Source: Oral (06/29/20 1141)  Respirations: 16 (06/29/20 1251)  Weight - Scale: 104 kg (229 lb 11 5 oz) (06/29/20 0851)  SpO2: 96 % (06/29/20 1251)     Physical Exam   Constitutional: He appears well-developed  No distress  HENT:   Head: Normocephalic and atraumatic  Eyes: Right eye exhibits no discharge  Left eye exhibits no discharge  Neck: No JVD present  Cardiovascular: Normal rate  No murmur heard  Pulmonary/Chest: Effort normal  No respiratory distress  Abdominal: Soft  He exhibits no distension  Musculoskeletal: Normal range of motion  He exhibits no edema  Neurological: He is alert  No cranial nerve deficit     Skin: Skin is warm             Additional Data:      Lab Results: I have personally reviewed pertinent reports             Results from last 7 days   Lab Units 06/29/20  0900   WBC Thousand/uL 7 41   HEMOGLOBIN g/dL 16 5   HEMATOCRIT % 49 5*   PLATELETS Thousands/uL 242   NEUTROS PCT % 68   LYMPHS PCT % 20   MONOS PCT % 8   EOS PCT % 3           Results from last 7 days   Lab Units 06/29/20  0900   SODIUM mmol/L 140   POTASSIUM mmol/L 4 3   CHLORIDE mmol/L 107   CO2 mmol/L 27   BUN mg/dL 21   CREATININE mg/dL 1 41*   ANION GAP mmol/L 6   CALCIUM mg/dL 8 9   ALBUMIN g/dL 3 7   TOTAL BILIRUBIN mg/dL 0 87   ALK PHOS U/L 78   ALT U/L 56   AST U/L 30   GLUCOSE RANDOM mg/dL 101                         Imaging: I have personally reviewed pertinent reports        XR chest 1 view portable   ED Interpretation by Calos Dubois MD (06/29 1015)   No acute cardiopulmonary abnormality       Final Result by Be Merino MD (06/29 1031)       No acute cardiopulmonary disease                Workstation performed: [id number]                 EKG, Pathology, and Other Studies Reviewed on Admission:   · EKG:      Allscripts / Epic Records Reviewed: Yes      ** Please Note: This note has been constructed using a voice recognition system   **

## 2020-06-29 NOTE — ED ATTENDING ATTESTATION
6/29/2020  ICarmen MD, saw and evaluated the patient  I have discussed the patient with the resident/non-physician practitioner and agree with the resident's/non-physician practitioner's findings, Plan of Care, and MDM as documented in the resident's/non-physician practitioner's note, except where noted  All available labs and Radiology studies were reviewed  I was present for key portions of any procedure(s) performed by the resident/non-physician practitioner and I was immediately available to provide assistance  At this point I agree with the current assessment done in the Emergency Department  I have conducted an independent evaluation of this patient a history and physical is as follows:  Pt reports a history of chest pain for the last week  CP is described as mild central CP that comes and goes  Not exertional or pleuritic  + mild sob when cp more bothersome and has had some episodes of diaphoresis  No h/o similar  No vte risk factors  Patient denies any neurologic symptoms  The patient does report a 1 month history of mild tenderness in his right calf and his daughter told him on Saturday that it looks swollen compared to his left calf  The patient denies any radiation of the pain to his arm, neck, back or other locations  The patient reports the pain is minimal at this point but present  The patient reports he took 325 mg of aspirin yesterday and today  Physical exam demonstrates a pleasant alert nontoxic male in no acute distress  HEENT exam is normal   Lungs are clear with equal breath sounds  The heart had a regular rate rhythm  The chest was nontender to palpation  The abdomen is soft and nontender  The right calf is slightly swollen compared to the left and minimally tender to palpation over the gastrocnemius  There is no erythema or warmth  All other extremities are normal   Skin had no rash  There is no neurologic deficit    ED Course         Critical Care Time  Procedures

## 2020-06-29 NOTE — ED PROVIDER NOTES
History  Chief Complaint   Patient presents with    Chest Pain     Pt reports cp and on and off lightheadedness for about a week  Pt staets worsened today  Pt reports R leg swelling starting since Saturday  Denies sob, n/v/d       69-year-old male history of heart block with pacemaker in place as well as hypertension presents for evaluation of chest pain  Patient states he has been experiencing intermittent chest pain for about a week  Is located on the left side of his chest is nonradiating, nonexertional, and nonpleuritic in nature  The pain only lasts for a few minutes at a time resolves on its own  When the pain is relieved bed is associated with occasional shortness of breath as well as lightheadedness  He denies any syncopal episodes  He has been taking 325 mg of aspirin the past few days  He also states for the past month or so he has noticed calf soreness on the right side and 1 of his family members said that his right leg looked slightly more swollen compared to the left  No DVT risk factors  No recent illnesses  No headache, fever, chills, cough, abdominal pain, nausea, vomiting, or diarrhea  Prior to Admission Medications   Prescriptions Last Dose Informant Patient Reported?  Taking?   acetaminophen (TYLENOL) 325 mg tablet Not Taking at Unknown time Self No No   Sig: Take 2 tablets (650 mg total) by mouth every 6 (six) hours as needed for mild pain   Patient not taking: Reported on 2/17/2020   amLODIPine (NORVASC) 5 mg tablet 6/29/2020 at Unknown time Self No Yes   Sig: TAKE 1 TABLET BY MOUTH DAILY   losartan (COZAAR) 100 MG tablet 6/29/2020 at Unknown time Self No Yes   Sig: TAKE 1 TABLET BY MOUTH DAILY   metoprolol succinate (TOPROL-XL) 50 mg 24 hr tablet 6/29/2020 at Unknown time  No Yes   Sig: TAKE ONE TABLET BY MOUTH EVERY DAY   tamsulosin (FLOMAX) 0 4 mg   No No   Sig: Take 1 capsule (0 4 mg total) by mouth daily with dinner      Facility-Administered Medications: None       Past Medical History:   Diagnosis Date    Abnormal ECG     Bradycardia     CKD (chronic kidney disease)     Dyspnea on effort     Heart block AV second degree     Hypertension     Kidney stone     Left ventricular hypertrophy     Renal insufficiency        Past Surgical History:   Procedure Laterality Date    CARDIAC PACEMAKER PLACEMENT  2017       Family History   Problem Relation Age of Onset    Hypertension Mother     Heart attack Father      I have reviewed and agree with the history as documented  E-Cigarette/Vaping    E-Cigarette Use Never User      E-Cigarette/Vaping Substances    Nicotine No     THC No     CBD No     Flavoring No     Other No     Unknown No      Social History     Tobacco Use    Smoking status: Current Some Day Smoker     Types: Cigars    Smokeless tobacco: Never Used    Tobacco comment: 1 a week   Substance Use Topics    Alcohol use: Never     Frequency: Never     Drinks per session: Patient refused     Binge frequency: Never    Drug use: No        Review of Systems   Constitutional: Negative for activity change, appetite change, chills, fatigue, fever and unexpected weight change  HENT: Negative for congestion, ear discharge, ear pain, nosebleeds, postnasal drip, rhinorrhea, sinus pressure, sinus pain, sore throat and tinnitus  Eyes: Negative for photophobia, pain, discharge, redness, itching and visual disturbance  Respiratory: Positive for shortness of breath  Negative for cough, choking, chest tightness and wheezing  Cardiovascular: Positive for chest pain  Negative for palpitations and leg swelling  Gastrointestinal: Negative for abdominal pain, blood in stool, constipation, diarrhea, nausea and vomiting  Genitourinary: Negative for dysuria, enuresis, flank pain and hematuria  Musculoskeletal: Negative for myalgias, neck pain and neck stiffness  Skin: Negative for color change, pallor, rash and wound     Neurological: Positive for light-headedness  Negative for dizziness, seizures, syncope, numbness and headaches  Hematological: Negative  Psychiatric/Behavioral: Negative  Physical Exam  ED Triage Vitals   Temperature Pulse Respirations Blood Pressure SpO2   06/29/20 1141 06/29/20 0851 06/29/20 0851 06/29/20 0851 06/29/20 0851   98 3 °F (36 8 °C) 60 20 152/89 97 %      Temp Source Heart Rate Source Patient Position - Orthostatic VS BP Location FiO2 (%)   06/29/20 1141 06/29/20 0851 06/29/20 0851 06/29/20 0851 --   Oral Monitor Lying Right arm       Pain Score       06/29/20 0851       4             Orthostatic Vital Signs  Vitals:    06/29/20 2241 06/30/20 0713 06/30/20 1048 06/30/20 1159   BP: 127/82 149/94 146/93 137/77   Pulse: 60 60 63 60   Patient Position - Orthostatic VS:  Lying  Lying       Physical Exam   Constitutional: He is oriented to person, place, and time  He appears well-developed and well-nourished  No distress  HENT:   Head: Normocephalic and atraumatic  Nose: Nose normal    Mouth/Throat: Oropharynx is clear and moist    Eyes: Pupils are equal, round, and reactive to light  Conjunctivae and EOM are normal    Neck: Normal range of motion  Neck supple  No JVD present  No tracheal deviation present  Cardiovascular: Normal rate, regular rhythm, normal heart sounds and intact distal pulses  No murmur heard  Pulmonary/Chest: Effort normal and breath sounds normal  No stridor  No respiratory distress  He has no wheezes  He has no rales  He exhibits no tenderness  Abdominal: Soft  Bowel sounds are normal  He exhibits no distension  There is no tenderness  There is no rebound and no guarding  Musculoskeletal: Normal range of motion  He exhibits no edema, tenderness or deformity  Right leg slightly more swollen compared to left, no calf tenderness, no pitting edema   Neurological: He is alert and oriented to person, place, and time  He displays normal reflexes  No cranial nerve deficit or sensory deficit   He exhibits normal muscle tone  Coordination normal    Skin: Skin is warm and dry  Capillary refill takes less than 2 seconds  No rash noted  He is not diaphoretic  No erythema  No pallor  Psychiatric: He has a normal mood and affect  His behavior is normal    Nursing note and vitals reviewed  ED Medications  Medications   acetaminophen (TYLENOL) tablet 650 mg (has no administration in time range)   amLODIPine (NORVASC) tablet 5 mg (5 mg Oral Given 6/30/20 1049)   losartan (COZAAR) tablet 100 mg (100 mg Oral Given 6/30/20 1049)   metoprolol succinate (TOPROL-XL) 24 hr tablet 50 mg (50 mg Oral Given 6/30/20 1049)   nicotine (NICODERM CQ) 14 mg/24hr TD 24 hr patch 1 patch (1 patch Transdermal Not Given 6/30/20 1047)   nitroglycerin (NITROSTAT) SL tablet 0 4 mg (has no administration in time range)   morphine injection 1 mg (has no administration in time range)   oxyCODONE (ROXICODONE) IR tablet 2 5 mg (has no administration in time range)   aspirin (ECOTRIN LOW STRENGTH) EC tablet 81 mg (81 mg Oral Given 6/30/20 1048)   nitroglycerin (NITRO-BID) 2 % TD ointment 0 5 inch (0 5 inches Topical Given 6/29/20 0949)       Diagnostic Studies  Results Reviewed     Procedure Component Value Units Date/Time    Novel Coronavirus [facility] [[id number]]  (Normal) Collected:  06/29/20 0949    Lab Status:  Final result Specimen:  Nares from Nose Updated:  06/29/20 1100     SARS-CoV-2 Negative    Narrative: The specimen collection materials, transport medium, and/or testing methodology utilized in the production of these test results have been proven to be reliable in a limited validation with an abbreviated program under the Emergency Utilization Authorization provided by the FDA  Testing reported as "Presumptive positive" will be confirmed with secondary testing with a reference laboratory to ensure result accuracy    Clinical caution and judgement should be used with the interpretation of these results with consideration of the clinical impression and other laboratory testing  Testing reported as "Positive" or "Negative" has been proven to be accurate according to standard laboratory validation requirements  All testing is performed with control materials showing appropriate reactivity at standard intervals        D-dimer, quantitative [[id number]]  (Normal) Collected:  06/29/20 0907    Lab Status:  Final result Specimen:  Blood from Arm, Left Updated:  06/29/20 0945     D-Dimer, Quant 0 34 ug/ml FEU     Troponin I [[id number]]  (Normal) Collected:  06/29/20 0900    Lab Status:  Final result Specimen:  Blood from Arm, Left Updated:  06/29/20 0929     Troponin I <0 02 ng/mL     Comprehensive metabolic panel [[id number]]  (Abnormal) Collected:  06/29/20 0900    Lab Status:  Final result Specimen:  Blood from Arm, Left Updated:  06/29/20 0928     Sodium 140 mmol/L      Potassium 4 3 mmol/L      Chloride 107 mmol/L      CO2 27 mmol/L      ANION GAP 6 mmol/L      BUN 21 mg/dL      Creatinine 1 41 mg/dL      Glucose 101 mg/dL      Calcium 8 9 mg/dL      AST 30 U/L      ALT 56 U/L      Alkaline Phosphatase 78 U/L      Total Protein 7 7 g/dL      Albumin 3 7 g/dL      Total Bilirubin 0 87 mg/dL      eGFR 52 ml/min/1 73sq m     Narrative:       Adolfo guidelines for Chronic Kidney Disease (CKD):     Stage 1 with normal or high GFR (GFR > 90 mL/min/1 73 square meters)    Stage 2 Mild CKD (GFR = 60-89 mL/min/1 73 square meters)    Stage 3A Moderate CKD (GFR = 45-59 mL/min/1 73 square meters)    Stage 3B Moderate CKD (GFR = 30-44 mL/min/1 73 square meters)    Stage 4 Severe CKD (GFR = 15-29 mL/min/1 73 square meters)    Stage 5 End Stage CKD (GFR <15 mL/min/1 73 square meters)  Note: GFR calculation is accurate only with a steady state creatinine    CBC and differential [[id number]]  (Abnormal) Collected:  06/29/20 0900    Lab Status:  Final result Specimen:  Blood from Arm, Left Updated:  06/29/20 0915     WBC 7 41 Thousand/uL      RBC 5 42 Million/uL      Hemoglobin 16 5 g/dL      Hematocrit 49 5 %      MCV 91 fL      MCH 30 4 pg      MCHC 33 3 g/dL      RDW 12 8 %      MPV 9 5 fL      Platelets 550 Thousands/uL      nRBC 0 /100 WBCs      Neutrophils Relative 68 %      Immat GRANS % 0 %      Lymphocytes Relative 20 %      Monocytes Relative 8 %      Eosinophils Relative 3 %      Basophils Relative 1 %      Neutrophils Absolute 5 03 Thousands/µL      Immature Grans Absolute 0 03 Thousand/uL      Lymphocytes Absolute 1 46 Thousands/µL      Monocytes Absolute 0 62 Thousand/µL      Eosinophils Absolute 0 19 Thousand/µL      Basophils Absolute 0 08 Thousands/µL                  XR chest 1 view portable   ED Interpretation by Julene Litten, MD (06/29 1015)   No acute cardiopulmonary abnormality      Final Result by Phong Zhang MD (06/29 1031)      No acute cardiopulmonary disease  Workstation performed: [id number]               Procedures  ECG 12 Lead Documentation Only  Date/Time: 6/29/2020 9:51 AM  Performed by: Julene Litten, MD  Authorized by: Julene Litten, MD     Indications / Diagnosis:  Cp  ECG reviewed by me, the ED Provider: yes    Patient location:  ED  Previous ECG:     Previous ECG:  Compared to current  Interpretation:     Interpretation: abnormal    Rate:     ECG rate:  59    ECG rate assessment: bradycardic    Rhythm:     Rhythm: paced    Pacing:     Capture:  Complete  Ectopy:     Ectopy: none    QRS:     QRS axis:  Left    QRS intervals:   Wide  Conduction:     Conduction: normal    ST segments:     ST segments:  Normal  T waves:     T waves: inverted      Inverted:  V2          ED Course  ED Course as of Jun 30 1537   Mon Jun 29, 2020   0906 Blood Pressure: 152/89   0906 Pulse: 60   0906 Respirations: 20   0906 SpO2: 97 %   0933 Appears baseline   Creatinine(!): 1 41   0933 Troponin I: <0 02   0950 D-Dimer, Quant: 0 34   1045 No acute cardiopulmonary abnormality on chest x-ray 1045 Heart score 5 will admit to medicine      1100 EXT SARS-COV-2: Negative       US AUDIT      Most Recent Value   Initial Alcohol Screen: US AUDIT-C    1  How often do you have a drink containing alcohol?  0 Filed at: 06/29/2020 0900   2  How many drinks containing alcohol do you have on a typical day you are drinking? 0 Filed at: 06/29/2020 0900   3a  Male UNDER 65: How often do you have five or more drinks on one occasion? 0 Filed at: 06/29/2020 0900   3b  FEMALE Any Age, or MALE 65+: How often do you have 4 or more drinks on one occassion? 0 Filed at: 06/29/2020 0900   Audit-C Score  0 Filed at: 06/29/2020 0900            HEART Risk Score      Most Recent Value   Heart Score Risk Calculator   History  1 Filed at: 06/29/2020 4917   ECG  1 Filed at: 06/29/2020 4314   Age  2 Filed at: 06/29/2020 8954   Risk Factors  1 Filed at: 06/29/2020 5423   Troponin  0 Filed at: 06/29/2020 4602   HEART Score  5 Filed at: 06/29/2020 7427        Identification of Seniors at Risk      Most Recent Value   (ISAR) Identification of Seniors at Risk   Before the illness or injury that brought you to the Emergency, did you need someone to help you on a regular basis? 0 Filed at: 06/29/2020 0852   In the last 24 hours, have you needed more help than usual?  0 Filed at: 06/29/2020 1934   Have you been hospitalized for one or more nights during the past 6 months? 0 Filed at: 06/29/2020 1772   In general, do you see well?  0 Filed at: 06/29/2020 9245   In general, do you have serious problems with your memory? 0 Filed at: 06/29/2020 0426   Do you take more than three different medications every day? 1 Filed at: 06/29/2020 4847   ISAR Score  1 Filed at: 06/29/2020 0996          AYESHA/DAST-10      Most Recent Value   How many times in the past year have you    Used an illegal drug or used a prescription medication for non-medical reasons?   Never Filed at: 06/29/2020 0900                  WILFREDO Risk Score      Most Recent Value Age >= 72  1 Filed at: 06/29/2020 1236   Known CAD (stenosis >= 50%)  0 Filed at: 06/29/2020 1236   Recent (<=24 hrs) Service Angina  1 Filed at: 06/29/2020 1236   ST Deviation >= 0 5 mm  0 Filed at: 06/29/2020 1236   3+ CAD Risk Factors (FHx, HTN, HLP, DM, Smoker)  1 Filed at: 06/29/2020 1236   Aspirin Use Past 7 Days  0 Filed at: 06/29/2020 1236   Elevated Cardiac Markers  0 Filed at: 06/29/2020 1236   WILFREDO Risk Score (Calculated)  3 Filed at: 06/29/2020 1236        Wells' Criteria for PE      Most Recent Value   Wells' Criteria for PE   Clinical signs and symptoms of DVT  3 Filed at: 06/29/2020 1000   PE is primary diagnosis or equally likely  0 Filed at: 06/29/2020 1000   HR >100  0 Filed at: 06/29/2020 1000   Immobilization at least 3 days or Surgery in the previous 4 weeks  0 Filed at: 06/29/2020 1000   Previous, objectively diagnosed PE or DVT  0 Filed at: 06/29/2020 1000   Hemoptysis  0 Filed at: 06/29/2020 1000   Malignancy with treatment within 6 months or palliative  0 Filed at: 06/29/2020 1000   Wells' Criteria Total  3 Filed at: 06/29/2020 1000                [facility]  Number of Diagnoses or Management Options  Chest pain:   SOB (shortness of breath):   Diagnosis management comments: 74-year-old male history of pacemaker as well as hypertension presents for intermittent chest pain which has become more persistent  On exam patient is overall well appearing in no acute distress  Will check cardiac workup including D-dimer as patient is moderate risk according to Wells criteria  Labs grossly unremarkable  EKG demonstrated paced rhythm  Given patient's risk factors and heart score 5 will admit to medicine for further management evaluation          Disposition  Final diagnoses:   Chest pain   SOB (shortness of breath)     Time reflects when diagnosis was documented in both MDM as applicable and the Disposition within this note     Time User Action Codes Description Comment    6/29/2020 11:24 AM Check, Michelle Sanchez Add [R07 9] Chest pain     6/29/2020 11:24 AM Check, Michelle Sanchez Add [R06 02] SOB (shortness of breath)       ED Disposition     ED Disposition Condition Date/Time Comment    Admit Stable Mon Jun 29, 2020 11:24 AM Case was discussed with MARIAA and the patient's admission status was agreed to be Admission Status: observation status to the service of Dr Lawton Goldmann  Follow-up Information     Follow up With Specialties Details Why Contact Info    Shelbie Medellin MD Family Medicine Follow up in 1 week(s)  [address] [facility]      Trang Zabala MD Cardiology Follow up in 2 week(s)  [address] [facility] [address] [facility]  [phone]            Discharge Medication List as of 6/30/2020  2:57 PM      START taking these medications    Details   aspirin (ECOTRIN LOW STRENGTH) 81 mg EC tablet Take 1 tablet (81 mg total) by mouth daily, Starting Wed 7/1/2020, Normal         CONTINUE these medications which have NOT CHANGED    Details   acetaminophen (TYLENOL) 325 mg tablet Take 2 tablets (650 mg total) by mouth every 6 (six) hours as needed for mild pain, Starting Fri 11/22/2019, Normal      amLODIPine (NORVASC) 5 mg tablet TAKE 1 TABLET BY MOUTH DAILY, Normal      losartan (COZAAR) 100 MG tablet TAKE 1 TABLET BY MOUTH DAILY, Normal      metoprolol succinate (TOPROL-XL) 50 mg 24 hr tablet TAKE ONE TABLET BY MOUTH EVERY DAY, Normal      tamsulosin (FLOMAX) 0 4 mg Take 1 capsule (0 4 mg total) by mouth daily with dinner, Starting Fri 11/22/2019, Until Sun 12/22/2019, Print           No discharge procedures on file  PDMP Review     None           ED Provider  Attending physically available and evaluated Sheryle Aaron CRUZ managed the patient along with the ED Attending      Electronically Signed by         Emeka Negrete MD  06/30/20 2127

## 2020-06-29 NOTE — PLAN OF CARE
Problem: PAIN - ADULT  Goal: Verbalizes/displays adequate comfort level or baseline comfort level  Description  Interventions:  - Encourage patient to monitor pain and request assistance  - Assess pain using appropriate pain scale  - Administer analgesics based on type and severity of pain and evaluate response  - Implement non-pharmacological measures as appropriate and evaluate response  - Consider cultural and social influences on pain and pain management  - Notify physician/advanced practitioner if interventions unsuccessful or patient reports new pain  6/29/2020 1333 by Romulo Paz RN  Outcome: Progressing  6/29/2020 1333 by Romulo Paz RN  Outcome: Progressing     Problem: INFECTION - ADULT  Goal: Absence or prevention of progression during hospitalization  Description  INTERVENTIONS:  - Assess and monitor for signs and symptoms of infection  - Monitor lab/diagnostic results  - Monitor all insertion sites, i e  indwelling lines, tubes, and drains  - [place] appropriate cooling/warming therapies per order  - Administer medications as ordered  - Instruct and encourage patient and family to use good hand hygiene technique  - Identify and instruct in appropriate isolation precautions for identified infection/condition   6/29/2020 1333 by Romulo Paz RN  Outcome: Progressing  6/29/2020 1333 by Romulo Paz RN  Outcome: Progressing  Goal: Absence of fever/infection during neutropenic period  Description  INTERVENTIONS:  - Monitor WBC    6/29/2020 1333 by Romulo Paz RN  Outcome: Progressing  6/29/2020 1333 by Romulo Paz RN  Outcome: Progressing     Problem: SAFETY ADULT  Goal: Patient will remain free of falls  Description  INTERVENTIONS:  - Assess patient frequently for physical needs  -  Identify cognitive and physical deficits and behaviors that affect risk of falls    -  [place] fall precautions as indicated by assessment   - Educate patient/family on patient safety including physical limitations  - Instruct patient to call for assistance with activity based on assessment  - Modify environment to reduce risk of injury  - Consider OT/PT consult to assist with strengthening/mobility  6/29/2020 1333 by Steve Johnson RN  Outcome: Progressing  6/29/2020 1333 by Steve Johnson RN  Outcome: Progressing  Goal: Maintain or return to baseline ADL function  Description  INTERVENTIONS:  -  Assess patient's ability to carry out ADLs; assess patient's baseline for ADL function and identify physical deficits which impact ability to perform ADLs (bathing, care of mouth/teeth, toileting, grooming, dressing, etc )  - Assess/evaluate cause of self-care deficits   - Assess range of motion  - Assess patient's mobility; develop plan if impaired  - Assess patient's need for assistive devices and provide as appropriate  - Encourage maximum independence but intervene and supervise when necessary  - Involve family in performance of ADLs  - Assess for home care needs following discharge   - Consider OT consult to assist with ADL evaluation and planning for discharge  - Provide patient education as appropriate  6/29/2020 1333 by Steve Johnson RN  Outcome: Progressing  6/29/2020 1333 by Steve Johnson RN  Outcome: Progressing  Goal: Maintain or return mobility status to optimal level  Description  INTERVENTIONS:  - Assess patient's baseline mobility status (ambulation, transfers, stairs, etc )    - Identify cognitive and physical deficits and behaviors that affect mobility  - Identify mobility aids required to assist with transfers and/or ambulation (gait belt, sit-to-stand, lift, walker, cane, etc )  - [place] fall precautions as indicated by assessment  - Record patient progress and toleration of activity level on Mobility SBAR; progress patient to next Phase/Stage  - Instruct patient to call for assistance with activity based on assessment  - Consider rehabilitation consult to assist with strengthening/weightbearing, etc   6/29/2020 1333 by Jayjay Hurley RN  Outcome: Progressing  6/29/2020 1333 by Jayjay Hurley RN  Outcome: Progressing

## 2020-06-29 NOTE — ASSESSMENT & PLAN NOTE
· Patient came to the hospital with intermittent chest pain    History is suggestive of atypical chest pain  · No relationship with activity  · Patient appears to be very anxious and seems to be worked up regarding the pain  · Had stress test in 2007  · WILFREDO score 3  · Troponin x3  · Followed by Lisa Cool as outpatient

## 2020-06-29 NOTE — ED NOTES
Pt also reports being bit by a tick 3 weeks ago and was not seen for it        Latasha Jimenez, CANDY  06/29/20 8536

## 2020-06-29 NOTE — ASSESSMENT & PLAN NOTE
· Patient is on Norvasc ,metoprolol and losartan as an outpatient  · Continue with the outpatient blood pressure medication  · Monitor blood pressure  · Appears to be at acceptable range

## 2020-06-29 NOTE — PROGRESS NOTES
Progress Note - Marie Moshe 1955, 71 y o  male MRN: [id number]    Unit/Bed#: [facility] 728-01 Encounter: [id number]    Primary Care Provider: Bobby Kothari MD   Date and time admitted to hospital: 6/29/2020  8:44 AM        * Chest pain  Assessment & Plan  · Patient came to the hospital with intermittent chest pain  History is suggestive of atypical chest pain  · No relationship with activity  · Patient appears to be very anxious and seems to be worked up regarding the pain  · Had stress test in 2007  · WILFREDO score 3  · Troponin x3  · Followed by Karen Reesely as outpatient    Tick bite  Assessment & Plan  · Patient reported that he had tick bite 4 weeks ago and he is worried that he has Lyme disease and some of the symptoms may be a manifestation of his Lyme disease  · No fever or any joint pain  · Obtain a Lyme titer-patient aware that the results is not going to be back by the time he is discharged and needs outpatient follow-up with the PCP    Chronic kidney disease  Assessment & Plan  · Creatinine 1 41  · Appears to be at baseline    History of pacemaker  Assessment & Plan  · Patient had pacemaker placement in 2017 for heart block  · Pacer interrogated in 05/2020-    Hypertension  Assessment & Plan  · Patient is on Norvasc ,metoprolol and losartan as an outpatient  · Continue with the outpatient blood pressure medication  · Monitor blood pressure  · Appears to be at acceptable range      VTE Prophylaxis: Length of  the stay less than 24 hours  / sequential compression device   Code Status:  Full code  POLST: POLST form is not discussed and not completed at this time  Discussion with family:  Patient    Anticipated Length of Stay:  Patient will be admitted on an Observation basis with an anticipated length of stay of  less 2 midnights  Justification for Hospital Stay:  Chest pain    Total Time for Visit, including Counseling / Coordination of Care: 1 hour    Greater than 50% of this total time spent on direct patient counseling and coordination of care  Chief Complaint:   Chest pain    History of Present Illness:    Clyde Dean is a 71 y o  male who presents with intermittent chest pain  Chest pain is mainly located in the precordial region and this has been going on for about a week  Patient denies any definite relationship with activity and chest pain  Pain is usually very short-lived last for minutes and resolved spontaneously  Patient reported that he feels more tired than usual   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 and gets easily tired but denies any chest pain with his activity  Patient does not exercise on a regular basis and he considered his yd work as his exercise  Denies any shortness of breath  Denies any palpitation  Denies any nausea vomiting diarrhea  Patient appears to be very anxious and asking multiple questions like whether he has a blockage in his arteries  Patient also gives history of right lower extremity swelling since he noted since Saturday  Patient is very worried that he has a blood clot in his leg and I explained to him that his D-dimer is negative which is a very sensitive test for blood clot and unlikely he is going to have a blood clot anywhere in his body with a negative D-dimer  Patient also worried that his symptoms may be related to Lyme disease since he had a tick bite couple of weeks ago in his head  He has not noticed any fever or any chills  Has not had any rash    Review of Systems:    Review of Systems   Constitutional: Positive for fatigue  Negative for activity change and appetite change  HENT: Negative  Eyes: Negative  Respiratory: Negative  Cardiovascular: Positive for chest pain  Gastrointestinal: Negative for abdominal distention and abdominal pain  Genitourinary: Negative  Musculoskeletal: Negative  Neurological: Positive for light-headedness and numbness (Intermittent by and numbness of bilateral lower extremity)  Hematological: Negative  Psychiatric/Behavioral: Negative  Past Medical and Surgical History:     Past Medical History:   Diagnosis Date    Abnormal ECG     Bradycardia     CKD (chronic kidney disease)     Dyspnea on effort     Heart block AV second degree     Hypertension     Kidney stone     Left ventricular hypertrophy     Renal insufficiency        Past Surgical History:   Procedure Laterality Date    CARDIAC PACEMAKER PLACEMENT  2017       Meds/Allergies:    Prior to Admission medications    Medication Sig Start Date End Date Taking? Authorizing Provider   amLODIPine (NORVASC) 5 mg tablet TAKE 1 TABLET BY MOUTH DAILY 10/31/19  Yes Mathew Ewing MD   losartan (COZAAR) 100 MG tablet TAKE 1 TABLET BY MOUTH DAILY 10/31/19  Yes Mathew Ewing MD   metoprolol succinate (TOPROL-XL) 50 mg 24 hr tablet TAKE ONE TABLET BY MOUTH EVERY DAY 5/29/20  Yes Mathew Ewing MD   acetaminophen (TYLENOL) 325 mg tablet Take 2 tablets (650 mg total) by mouth every 6 (six) hours as needed for mild pain  Patient not taking: Reported on 2/17/2020 11/22/19   Vane Lombardi DO   tamsulosin [facility]) 0 4 mg Take 1 capsule (0 4 mg total) by mouth daily with dinner 11/22/19 12/22/19  Vane Lombardi DO   docusate sodium (COLACE) 100 mg capsule Take 1 capsule (100 mg total) by mouth 2 (two) times a day as needed for constipation  Patient not taking: Reported on 2/17/2020 11/22/19 6/29/20  Vane Lombardi DO   oxyCODONE (ROXICODONE) 5 mg immediate release tablet Take 1 tablet (5 mg total) by mouth every 6 (six) hours as needed for severe painMax Daily Amount: 20 mg  Patient not taking: Reported on 2/17/2020 11/22/19 6/29/20  Vane Lombardi DO     I have reviewed home medications with patient personally  Allergies:    Allergies   Allergen Reactions    Bactrim [Sulfamethoxazole-Trimethoprim] Hives    Iv Contrast [Iodinated Diagnostic Agents]        Social History:     Marital Status: /Civil Union   Occupation: Works a sedentary job-[employer]  Patient Pre-hospital Living Situation:  Lives at home with family  Patient Pre-hospital Level of Mobility:  Independent  Patient Pre-hospital Diet Restrictions:  Substance Use History:   Social History     Substance and Sexual Activity   Alcohol Use Never    Frequency: Never    Drinks per session: Patient refused    Binge frequency: Never     Social History     Tobacco Use   Smoking Status Current Some Day Smoker    Types: Cigars   Smokeless Tobacco Never Used   Tobacco Comment    1 a week     Social History     Substance and Sexual Activity   Drug Use No       Family History:    Family History   Problem Relation Age of Onset    Hypertension Mother     Heart attack Father        Physical Exam:     Vitals:   Blood Pressure: 124/84 (06/29/20 1251)  Pulse: 66 (06/29/20 1251)  Temperature: 98 2 °F (36 8 °C) (06/29/20 1251)  Temp Source: Oral (06/29/20 1141)  Respirations: 16 (06/29/20 1251)  Weight - Scale: 104 kg (229 lb 11 5 oz) (06/29/20 0851)  SpO2: 96 % (06/29/20 1251)    Physical Exam   Constitutional: He appears well-developed  No distress  HENT:   Head: Normocephalic and atraumatic  Eyes: Right eye exhibits no discharge  Left eye exhibits no discharge  Neck: No JVD present  Cardiovascular: Normal rate  No murmur heard  Pulmonary/Chest: Effort normal  No respiratory distress  Abdominal: Soft  He exhibits no distension  Musculoskeletal: Normal range of motion  He exhibits no edema  Neurological: He is alert  No cranial nerve deficit  Skin: Skin is warm  Additional Data:     Lab Results: I have personally reviewed pertinent reports        Results from last 7 days   Lab Units 06/29/20  0900   WBC Thousand/uL 7 41   HEMOGLOBIN g/dL 16 5   HEMATOCRIT % 49 5*   PLATELETS Thousands/uL 242   NEUTROS PCT % 68   LYMPHS PCT % 20   MONOS PCT % 8   EOS PCT % 3     Results from last 7 days   Lab Units 06/29/20  0900   SODIUM mmol/L 140   POTASSIUM mmol/L 4  3   CHLORIDE mmol/L 107   CO2 mmol/L 27   BUN mg/dL 21   CREATININE mg/dL 1 41*   ANION GAP mmol/L 6   CALCIUM mg/dL 8 9   ALBUMIN g/dL 3 7   TOTAL BILIRUBIN mg/dL 0 87   ALK PHOS U/L 78   ALT U/L 56   AST U/L 30   GLUCOSE RANDOM mg/dL 101                       Imaging: I have personally reviewed pertinent reports  XR chest 1 view portable   ED Interpretation by Kalie Parish MD (06/29 1015)   No acute cardiopulmonary abnormality      Final Result by Young Foy MD (06/29 1031)      No acute cardiopulmonary disease  Workstation performed: [id number]             EKG, Pathology, and Other Studies Reviewed on Admission:   · EKG:     Allscripts / Epic Records Reviewed: Yes     ** Please Note: This note has been constructed using a voice recognition system   **

## 2020-06-30 VITALS
DIASTOLIC BLOOD PRESSURE: 77 MMHG | BODY MASS INDEX: 31.16 KG/M2 | RESPIRATION RATE: 16 BRPM | WEIGHT: 229.72 LBS | TEMPERATURE: 98.5 F | HEART RATE: 60 BPM | SYSTOLIC BLOOD PRESSURE: 137 MMHG | OXYGEN SATURATION: 96 %

## 2020-06-30 LAB
ANION GAP SERPL CALCULATED.3IONS-SCNC: 8 MMOL/L (ref 4–13)
ATRIAL RATE: 60 BPM
B BURGDOR IGG+IGM SER-ACNC: <0.91 ISR (ref 0–0.9)
BUN SERPL-MCNC: 22 MG/DL (ref 5–25)
CALCIUM SERPL-MCNC: 8.7 MG/DL (ref 8.3–10.1)
CHLORIDE SERPL-SCNC: 107 MMOL/L (ref 100–108)
CO2 SERPL-SCNC: 25 MMOL/L (ref 21–32)
CREAT SERPL-MCNC: 1.45 MG/DL (ref 0.6–1.3)
ERYTHROCYTE [DISTWIDTH] IN BLOOD BY AUTOMATED COUNT: 13.1 % (ref 11.6–15.1)
GFR SERPL CREATININE-BSD FRML MDRD: 50 ML/MIN/1.73SQ M
GLUCOSE SERPL-MCNC: 100 MG/DL (ref 65–140)
HCT VFR BLD AUTO: 50.3 % (ref 36.5–49.3)
HGB BLD-MCNC: 16.4 G/DL (ref 12–17)
MCH RBC QN AUTO: 30.5 PG (ref 26.8–34.3)
MCHC RBC AUTO-ENTMCNC: 32.6 G/DL (ref 31.4–37.4)
MCV RBC AUTO: 94 FL (ref 82–98)
P AXIS: 32 DEGREES
PLATELET # BLD AUTO: 209 THOUSANDS/UL (ref 149–390)
PMV BLD AUTO: 10.1 FL (ref 8.9–12.7)
POTASSIUM SERPL-SCNC: 4.2 MMOL/L (ref 3.5–5.3)
PR INTERVAL: 118 MS
QRS AXIS: -65 DEGREES
QRSD INTERVAL: 158 MS
QT INTERVAL: 456 MS
QTC INTERVAL: 456 MS
RBC # BLD AUTO: 5.37 MILLION/UL (ref 3.88–5.62)
SODIUM SERPL-SCNC: 140 MMOL/L (ref 136–145)
T WAVE AXIS: 100 DEGREES
VENTRICULAR RATE: 60 BPM
WBC # BLD AUTO: 6.87 THOUSAND/UL (ref 4.31–10.16)

## 2020-06-30 PROCEDURE — 85027 COMPLETE CBC AUTOMATED: CPT | Performed by: FAMILY MEDICINE

## 2020-06-30 PROCEDURE — 93010 ELECTROCARDIOGRAM REPORT: CPT | Performed by: INTERNAL MEDICINE

## 2020-06-30 PROCEDURE — 80048 BASIC METABOLIC PNL TOTAL CA: CPT | Performed by: FAMILY MEDICINE

## 2020-06-30 PROCEDURE — 99238 HOSP IP/OBS DSCHRG MGMT 30/<: CPT | Performed by: INTERNAL MEDICINE

## 2020-06-30 RX ORDER — ASPIRIN 81 MG/1
81 TABLET ORAL DAILY
Qty: 30 TABLET | Refills: 0 | Status: SHIPPED | OUTPATIENT
Start: 2020-07-01

## 2020-06-30 RX ADMIN — ASPIRIN 81 MG: 81 TABLET, COATED ORAL at 10:48

## 2020-06-30 RX ADMIN — METOPROLOL SUCCINATE 50 MG: 50 TABLET, EXTENDED RELEASE ORAL at 10:49

## 2020-06-30 RX ADMIN — LOSARTAN POTASSIUM 100 MG: 50 TABLET, FILM COATED ORAL at 10:49

## 2020-06-30 RX ADMIN — AMLODIPINE BESYLATE 5 MG: 5 TABLET ORAL at 10:49

## 2020-06-30 NOTE — UTILIZATION REVIEW
Initial Clinical Review    Admission: Date/Time/Statement: Admission Orders (From admission, onward)     Ordered        06/29/20 1125  Place in Observation  Once                   Orders Placed This Encounter   Procedures    Place in Observation     Standing Status:   Standing     Number of Occurrences:   1     Order Specific Question:   Admitting Physician     Answer:   Twin Shukla [[id number]]     Order Specific Question:   Level of Care     Answer:   Med Surg [[id number]]     ED Arrival Information     Expected Arrival Acuity Means of Arrival Escorted By Service Admission Type    - 6/29/2020 08:15 Emergent Walk-In Self Hospitalist Emergency    Arrival Complaint    chest pain        Chief Complaint   Patient presents with    Chest Pain     Pt reports cp and on and off lightheadedness for about a week  Pt staets worsened today  Pt reports R leg swelling starting since Saturday  Denies sob, n/v/d  Assessment/Plan:       71year old male presents to ed from home for evaluation and treatment of chest pain  PMHX:  Heart block with pacemaker, renal insufficiency  and hypertension  On arrival patient reports symptoms of chest pain, right leg swelling and lightheadedness began intermittently about a week ago but worsened today  Patient took aspirin today prior to arrival   Physical examination significant for creatinine 1 41 ( baseline)   Troponin wnl,  ekg with paced rhythm, chest x ray w/o acute finding, pain 4/10, slight swelling of the right calf and slightly tender to palpation  WILFREDO =3    Treated in ed  nitrobid topical    Admit to observation for chest pain  Plan includes metoprolol, losartan, amlodipine and aspirin, ekg with troponin x3, telemetry, cbc w platelets and bmp  Obtain lyme titer as patient reports tic bite 4 months ago and he is worried that this is the cause of his symptoms            ED Triage Vitals   06/29/20 1141 06/29/20 0851 06/29/20 0851 06/29/20 0851 06/29/20 0851   98 3 °F (36 8 °C) 60 20 152/89 97 %      Oral Monitor         Pain Score       4       06/29/20 104 kg (229 lb 11 5 oz)     Additional Vital Signs:       Date/Time  Temp  Pulse  Resp  BP  MAP (mmHg)  SpO2  O2 Device   06/30/20 07:13:16  97 4 °F (36 3 °C)  Abnormal   60  16  149/94  112  96 %     06/29/20 22:41:30  97 7 °F (36 5 °C)  60  16  127/82  97  96 %     06/29/20 19:21:04  98 4 °F (36 9 °C)  60  20  128/82  97  95 %     06/29/20 15:09:50  97 2 °F (36 2 °C)  Abnormal   61  18  126/82  97  96 %     06/29/20 1300              None (Room air)   06/29/20 12:51:21  98 2 °F (36 8 °C)  66  16  124/84  97  96 %     06/29/20 1200            95 %     06/29/20 1141  98 3 °F (36 8 °C)               06/29/20 1117    60  18  136/81    96 %  None (Room air)   06/29/20 1030    59  20  130/73    96 %  None (Room air)   06/29/20 0952    59  18  131/72    97 %  None (Room air)   06/29/20 0930    58  22  131/72  96  95 %  None (Room air)   06/29/20 0851    60  20  152/89    97 %  None (Room air)             Pertinent Labs/Diagnostic Test Results:     Collection Time Result Time Vent R Atrial R MT Int  QRSD Int  QT Int  QTC Int   P Axis QRS Axis T Wave Ax    06/29/20 08:55:53 06/29/20 09:16:03 59 59 176 154 450 445 60 -65 97     Electronic ventricular pacemaker  Abnormal ECG  When compared with ECG of 21-NOV-2019 16:36,  Electronic ventricular pacemaker is now Present      Results from last 7 days   Lab Units 06/29/20  0949   SARS-COV-2  Negative     Results from last 7 days   Lab Units 06/30/20  0510 06/29/20  0900   WBC Thousand/uL 6 87 7 41   HEMOGLOBIN g/dL 16 4 16 5   HEMATOCRIT % 50 3* 49 5*   PLATELETS Thousands/uL 209 242   NEUTROS ABS Thousands/µL  --  5 03         Results from last 7 days   Lab Units 06/30/20  0510 06/29/20  0900   SODIUM mmol/L 140 140   POTASSIUM mmol/L 4 2 4 3   CHLORIDE mmol/L 107 107   CO2 mmol/L 25 27   ANION GAP mmol/L 8 6   BUN mg/dL 22 21   CREATININE mg/dL 1 45* 1 41*   EGFR ml/min/1 73sq m 50 52   CALCIUM mg/dL 8 7 8 9     Results from last 7 days   Lab Units 06/29/20  0900   AST U/L 30   ALT U/L 56   ALK PHOS U/L 78   TOTAL PROTEIN g/dL 7 7   ALBUMIN g/dL 3 7   TOTAL BILIRUBIN mg/dL 0 87         Results from last 7 days   Lab Units 06/30/20  0510 06/29/20  0900   GLUCOSE RANDOM mg/dL 100 101       Results from last 7 days   Lab Units 06/29/20  2221 06/29/20  1930 06/29/20  1452 06/29/20  0900   TROPONIN I ng/mL <0 02 <0 02 <0 02 <0 02     Results from last 7 days   Lab Units 06/29/20  0907   D-DIMER QUANTITATIVE ug/ml FEU 0 34       ED Treatment:   Medication Administration from 06/29/2020 0815 to 06/29/2020 1214       Date/Time Order Dose Route Action     06/29/2020 0949 nitroglycerin (NITRO-BID) 2 % TD ointment 0 5 inch 0 5 inch Topical Given        Past Medical History:   Diagnosis    Abnormal ECG    Bradycardia    CKD (chronic kidney disease)    Dyspnea on effort    Heart block AV second degree    Hypertension    Kidney stone    Left ventricular hypertrophy    Renal insufficiency     Present on Admission:   Hypertension      Admitting Diagnosis:     Chest pain [R07 9]  SOB (shortness of breath) [R06 02]    Age/Sex: 71 y o  male     Admission Orders:  Scheduled Medications:    Medications:  amLODIPine 5 mg Oral Daily   aspirin 81 mg Oral Daily   losartan 100 mg Oral Daily   metoprolol succinate 50 mg Oral Daily   nicotine 1 patch Transdermal Daily     Continuous IV Infusions:     PRN Meds:    acetaminophen 650 mg Oral Q6H PRN   morphine injection 1 mg Intravenous Q4H PRN   nitroglycerin 0 4 mg Sublingual Q5 Min PRN   oxyCODONE 2 5 mg Oral Q4H PRN       None    Network Utilization Review Department  [email or web address]  ATTENTION: Please call with any questions or concerns to [phone] and carefully listen to the prompts so that you are directed to the right person   All voicemails are confidential   Nicol San all requests for admission clinical reviews, approved or denied determinations and any other requests to dedicated fax number below belonging to the campus where the patient is receiving treatment   List of dedicated fax numbers for the Facilities:  [address] [place] [address] DENIALS (Administrative/Medical Necessity) [phone]   [address] (Maternity/NICU/Pediatrics) [phone]   Michael Nuñezmike [phone]   [place] Chávez [phone]   Ruthann De Jesus [phone]   Keara Harley [phone]   [address] [place] [address] [phone]   [facility]  [phone]   [address] [facility]  [address] [facility] [address] [facility] [phone]

## 2020-06-30 NOTE — DISCHARGE INSTRUCTIONS

## 2020-06-30 NOTE — RESTORATIVE TECHNICIAN NOTE
Restorative Specialist Mobility Note       Activity: Ambulate in trent, Ambulate in room, Bathroom privileges, Chair, Dangle, Stand at bedside(Educated/encouraged pt to ambulate with assistance 3-4 x's/day   Pt callbell, phone/tray within reach )     Assistive Device: None             Renata AVILA, Restorative Technician, [employer] [facility]

## 2020-06-30 NOTE — SOCIAL WORK
Met with pt to discuss the role of CM and to discuss any help pt may need prior to dc  Pt lives with his wife Panfilo hodge in a 2 story home with 1 KILLIAN  Pt performed ADL's indptly pta, no use of DME  No hx of HHC or rehab  No hx of mental health or D&A treatment  Pt drives  Pt's preferred pharmacy is [facility] in [place]  Contact: Panfilo hodge (wife) [phone]  No POA or living will  Panfilo hodge will transport home at [facility]  CM reviewed d/c planning process including the following: identifying help at home, patient preference for d/c planning needs, Discharge Lounge, [facility] Meds to Bed program, availability of treatment team to discuss questions or concerns patient and/or family may have regarding understanding medications and recognizing signs and symptoms once discharged  CM also encouraged patient to follow up with all recommended appointments after discharge  Patient advised of importance for patient and family to participate in managing patients medical well being  Patient/caregiver received discharge checklist  Content reviewed  Patient/caregiver encouraged to participate in discharge plan of care prior to discharge home

## 2020-07-01 ENCOUNTER — TRANSITIONAL CARE MANAGEMENT (OUTPATIENT)
Dept: FAMILY MEDICINE CLINIC | Facility: CLINIC | Age: 65
End: 2020-07-01

## 2020-10-23 DIAGNOSIS — I10 HYPERTENSION, UNSPECIFIED TYPE: ICD-10-CM

## 2020-10-24 RX ORDER — LOSARTAN POTASSIUM 100 MG/1
TABLET ORAL
Qty: 90 TABLET | Refills: 3 | Status: SHIPPED | OUTPATIENT
Start: 2020-10-24 | End: 2020-10-26

## 2020-10-24 RX ORDER — AMLODIPINE BESYLATE 5 MG/1
TABLET ORAL
Qty: 90 TABLET | Refills: 3 | Status: SHIPPED | OUTPATIENT
Start: 2020-10-24 | End: 2020-10-26

## 2020-10-26 DIAGNOSIS — I10 HYPERTENSION, UNSPECIFIED TYPE: ICD-10-CM

## 2020-10-26 RX ORDER — AMLODIPINE BESYLATE 5 MG/1
TABLET ORAL
Qty: 90 TABLET | Refills: 3 | Status: SHIPPED | OUTPATIENT
Start: 2020-10-26 | End: 2021-10-25

## 2020-10-26 RX ORDER — LOSARTAN POTASSIUM 100 MG/1
TABLET ORAL
Qty: 90 TABLET | Refills: 3 | Status: SHIPPED | OUTPATIENT
Start: 2020-10-26 | End: 2021-03-02 | Stop reason: SDUPTHER

## 2021-02-17 ENCOUNTER — IN-CLINIC DEVICE VISIT (OUTPATIENT)
Dept: CARDIOLOGY CLINIC | Facility: CLINIC | Age: 66
End: 2021-02-17
Payer: MEDICARE

## 2021-02-17 DIAGNOSIS — Z95.0 PRESENCE OF CARDIAC PACEMAKER: Primary | ICD-10-CM

## 2021-02-17 PROCEDURE — 93280 PM DEVICE PROGR EVAL DUAL: CPT | Performed by: INTERNAL MEDICINE

## 2021-02-17 NOTE — PROGRESS NOTES
Results for orders placed or performed in visit on 02/17/21   Cardiac EP device report    Narrative    DR DANNY NEGRON-DUAL PACEMAKER (AAI-DDD MODE)  DEVICE INTERROGATED IN THE [place] OFFICE: BATTERY VOLTAGE ADEQUATE (5 5 YRS)  AP: 56 3%  : 34 5%  ALL LEAD PARAMETERS WITHIN NORMAL LIMITS  2 VT MONITORED EPISODES W/ EGRMS SHOWING NSVT 9 BEATS @ 192 BPM, 12 BEATS @ 171 BPM   PT TAKES ASA 81MG, METOPROLOL SUCC  EF: 77% (NUC ST TX 9/20/17)  NO PROGRAMMING CHANGES MADE TO DEVICE PARAMETERS  PACEMAKER FUNCTIONING APPROPRIATELY    [address] [place] [address]

## 2021-03-02 DIAGNOSIS — I10 HYPERTENSION, UNSPECIFIED TYPE: ICD-10-CM

## 2021-03-02 RX ORDER — LOSARTAN POTASSIUM 100 MG/1
100 TABLET ORAL DAILY
Qty: 90 TABLET | Refills: 3 | Status: SHIPPED | OUTPATIENT
Start: 2021-03-02 | End: 2021-11-09 | Stop reason: SDUPTHER

## 2021-05-26 ENCOUNTER — REMOTE DEVICE CLINIC VISIT (OUTPATIENT)
Dept: CARDIOLOGY CLINIC | Facility: CLINIC | Age: 66
End: 2021-05-26
Payer: MEDICARE

## 2021-05-26 DIAGNOSIS — Z95.0 CARDIAC PACEMAKER IN SITU: Primary | ICD-10-CM

## 2021-05-26 PROCEDURE — 93296 REM INTERROG EVL PM/IDS: CPT | Performed by: INTERNAL MEDICINE

## 2021-05-26 PROCEDURE — 93294 REM INTERROG EVL PM/LDLS PM: CPT | Performed by: INTERNAL MEDICINE

## 2021-05-26 NOTE — PROGRESS NOTES
Results for orders placed or performed in visit on 05/26/21   Cardiac EP device report    Narrative    DR DANNY NEGRON-DUAL PACEMAKER (AAI-DDD MODE)  CARELINK TRANSMISSION: BATTERY STATUS "5 YRS"  AP 51%  100%  ALL AVAILABLE LEAD PARAMETERS WITHIN NORMAL LIMITS  NO SIGNIFICANT HIGH RATE EPISODES  NORMAL DEVICE FUNCTION   NC       Current Outpatient Medications:     acetaminophen (TYLENOL) 325 mg tablet, Take 2 tablets (650 mg total) by mouth every 6 (six) hours as needed for mild pain (Patient not taking: Reported on 2/17/2020), Disp: 30 tablet, Rfl: 0    amLODIPine (NORVASC) 5 mg tablet, TAKE 1 TABLET BY MOUTH DAILY, Disp: 90 tablet, Rfl: 3    aspirin (ECOTRIN LOW STRENGTH) 81 mg EC tablet, Take 1 tablet (81 mg total) by mouth daily, Disp: 30 tablet, Rfl: 0    losartan (COZAAR) 100 MG tablet, Take 1 tablet (100 mg total) by mouth daily, Disp: 90 tablet, Rfl: 3    metoprolol succinate (TOPROL-XL) 50 mg 24 hr tablet, TAKE ONE TABLET BY MOUTH EVERY DAY, Disp: 30 tablet, Rfl: 6    tamsulosin (FLOMAX) 0 4 mg, Take 1 capsule (0 4 mg total) by mouth daily with dinner, Disp: 30 capsule, Rfl: 0

## 2021-05-31 DIAGNOSIS — I47.1 SVT (SUPRAVENTRICULAR TACHYCARDIA) (HCC): ICD-10-CM

## 2021-06-01 RX ORDER — METOPROLOL SUCCINATE 50 MG/1
TABLET, EXTENDED RELEASE ORAL
Qty: 30 TABLET | Refills: 6 | Status: SHIPPED | OUTPATIENT
Start: 2021-06-01 | End: 2021-12-24

## 2021-10-12 ENCOUNTER — OFFICE VISIT (OUTPATIENT)
Dept: FAMILY MEDICINE CLINIC | Facility: CLINIC | Age: 66
End: 2021-10-12
Payer: MEDICARE

## 2021-10-12 VITALS
WEIGHT: 244.5 LBS | BODY MASS INDEX: 33.12 KG/M2 | RESPIRATION RATE: 16 BRPM | SYSTOLIC BLOOD PRESSURE: 120 MMHG | TEMPERATURE: 97.5 F | HEIGHT: 72 IN | DIASTOLIC BLOOD PRESSURE: 90 MMHG | OXYGEN SATURATION: 99 % | HEART RATE: 59 BPM

## 2021-10-12 DIAGNOSIS — R35.1 NOCTURIA: ICD-10-CM

## 2021-10-12 DIAGNOSIS — R39.9 UTI SYMPTOMS: Primary | ICD-10-CM

## 2021-10-12 DIAGNOSIS — I47.1 SVT (SUPRAVENTRICULAR TACHYCARDIA) (HCC): ICD-10-CM

## 2021-10-12 DIAGNOSIS — I10 ESSENTIAL HYPERTENSION: ICD-10-CM

## 2021-10-12 DIAGNOSIS — N18.9 CHRONIC KIDNEY DISEASE, UNSPECIFIED CKD STAGE: ICD-10-CM

## 2021-10-12 DIAGNOSIS — Z12.5 ENCOUNTER FOR SCREENING FOR MALIGNANT NEOPLASM OF PROSTATE: ICD-10-CM

## 2021-10-12 LAB
SL AMB  POCT GLUCOSE, UA: NORMAL
SL AMB LEUKOCYTE ESTERASE,UA: NORMAL
SL AMB POCT BILIRUBIN,UA: NORMAL
SL AMB POCT BLOOD,UA: NORMAL
SL AMB POCT CLARITY,UA: CLEAR
SL AMB POCT COLOR,UA: YELLOW
SL AMB POCT KETONES,UA: NORMAL
SL AMB POCT NITRITE,UA: NORMAL
SL AMB POCT PH,UA: 5
SL AMB POCT SPECIFIC GRAVITY,UA: 1
SL AMB POCT URINE PROTEIN: NORMAL
SL AMB POCT UROBILINOGEN: 0.2

## 2021-10-12 PROCEDURE — 81002 URINALYSIS NONAUTO W/O SCOPE: CPT | Performed by: FAMILY MEDICINE

## 2021-10-12 PROCEDURE — 99213 OFFICE O/P EST LOW 20 MIN: CPT | Performed by: FAMILY MEDICINE

## 2021-10-12 RX ORDER — LEVOFLOXACIN 500 MG/1
500 TABLET, FILM COATED ORAL EVERY 24 HOURS
Qty: 10 TABLET | Refills: 1 | Status: SHIPPED | OUTPATIENT
Start: 2021-10-12 | End: 2021-10-22

## 2021-10-25 DIAGNOSIS — I10 HYPERTENSION, UNSPECIFIED TYPE: ICD-10-CM

## 2021-10-25 RX ORDER — AMLODIPINE BESYLATE 5 MG/1
TABLET ORAL
Qty: 90 TABLET | Refills: 3 | Status: SHIPPED | OUTPATIENT
Start: 2021-10-25

## 2021-11-09 DIAGNOSIS — I10 HYPERTENSION, UNSPECIFIED TYPE: ICD-10-CM

## 2021-11-09 RX ORDER — LOSARTAN POTASSIUM 100 MG/1
100 TABLET ORAL DAILY
Qty: 90 TABLET | Refills: 3 | Status: SHIPPED | OUTPATIENT
Start: 2021-11-09

## 2021-12-24 DIAGNOSIS — I47.1 SVT (SUPRAVENTRICULAR TACHYCARDIA) (HCC): ICD-10-CM

## 2021-12-24 RX ORDER — METOPROLOL SUCCINATE 50 MG/1
TABLET, EXTENDED RELEASE ORAL
Qty: 30 TABLET | Refills: 6 | Status: SHIPPED | OUTPATIENT
Start: 2021-12-24 | End: 2022-08-09

## 2022-01-01 ENCOUNTER — TELEPHONE (OUTPATIENT)
Dept: OTHER | Facility: OTHER | Age: 67
End: 2022-01-01

## 2022-01-01 ENCOUNTER — TELEPHONE (OUTPATIENT)
Dept: FAMILY MEDICINE CLINIC | Facility: CLINIC | Age: 67
End: 2022-01-01

## 2022-05-12 ENCOUNTER — REMOTE DEVICE CLINIC VISIT (OUTPATIENT)
Dept: CARDIOLOGY CLINIC | Facility: CLINIC | Age: 67
End: 2022-05-12
Payer: MEDICARE

## 2022-05-12 DIAGNOSIS — Z95.0 CARDIAC PACEMAKER IN SITU: Primary | ICD-10-CM

## 2022-05-12 PROCEDURE — 93294 REM INTERROG EVL PM/LDLS PM: CPT | Performed by: INTERNAL MEDICINE

## 2022-05-12 PROCEDURE — 93296 REM INTERROG EVL PM/IDS: CPT | Performed by: INTERNAL MEDICINE

## 2022-05-12 NOTE — PROGRESS NOTES
Results for orders placed or performed in visit on 05/12/22   Cardiac EP device report    Narrative    DR DANNY WAYNET-DUAL PACEMAKER (AAI-DDD MODE)  CARELINK TRANSMISSION: BATTERY STATUS "4 5 YRS " AP 39%  100%  ALL AVAILABLE LEAD PARAMETERS WITHIN NORMAL LIMITS  4 NSVT NOTED; RATES >160 BPM  PT ON METO SUCC & EF 77% (2017 NUC)  NORMAL DEVICE FUNCTION   NC

## 2022-07-10 ENCOUNTER — OFFICE VISIT (OUTPATIENT)
Dept: URGENT CARE | Facility: CLINIC | Age: 67
End: 2022-07-10
Payer: MEDICARE

## 2022-07-10 VITALS
HEART RATE: 65 BPM | DIASTOLIC BLOOD PRESSURE: 91 MMHG | RESPIRATION RATE: 18 BRPM | OXYGEN SATURATION: 95 % | TEMPERATURE: 98.9 F | SYSTOLIC BLOOD PRESSURE: 148 MMHG

## 2022-07-10 DIAGNOSIS — J06.9 UPPER RESPIRATORY TRACT INFECTION, UNSPECIFIED TYPE: ICD-10-CM

## 2022-07-10 DIAGNOSIS — J30.1 SEASONAL ALLERGIC RHINITIS DUE TO POLLEN: Primary | ICD-10-CM

## 2022-07-10 PROCEDURE — U0005 INFEC AGEN DETEC AMPLI PROBE: HCPCS | Performed by: PREVENTIVE MEDICINE

## 2022-07-10 PROCEDURE — U0003 INFECTIOUS AGENT DETECTION BY NUCLEIC ACID (DNA OR RNA); SEVERE ACUTE RESPIRATORY SYNDROME CORONAVIRUS 2 (SARS-COV-2) (CORONAVIRUS DISEASE [COVID-19]), AMPLIFIED PROBE TECHNIQUE, MAKING USE OF HIGH THROUGHPUT TECHNOLOGIES AS DESCRIBED BY CMS-2020-01-R: HCPCS | Performed by: PREVENTIVE MEDICINE

## 2022-07-10 PROCEDURE — 99213 OFFICE O/P EST LOW 20 MIN: CPT | Performed by: PREVENTIVE MEDICINE

## 2022-07-10 PROCEDURE — G0463 HOSPITAL OUTPT CLINIC VISIT: HCPCS | Performed by: PREVENTIVE MEDICINE

## 2022-07-10 NOTE — PATIENT INSTRUCTIONS
I think it might be dealing with a seasonal allergy  Start using Zyrtec 1 a day  Flonase 2 sprays each nostril 3 times a day

## 2022-07-10 NOTE — PROGRESS NOTES
[address] [employer] [facility]        NAME: Jael Pulido is a 75 y o  male  : 1955    MRN: [id number]  DATE: July 10, 2022  TIME: 10:31 AM    Assessment and Plan   Seasonal allergic rhinitis due to pollen [J30 1]  1  Seasonal allergic rhinitis due to pollen     2  Upper respiratory tract infection, unspecified type           Patient Instructions       Follow up with PCP in 3-5 days  Proceed to  ER if symptoms worsen  Chief Complaint     Chief Complaint   Patient presents with    Cold Like Symptoms     Patient c/o cough, nasal congestion and sore throat x 1 month  History of Present Illness       Coughing and sneezing and postnasal drip times at least a month  No fever  No shortness of breaths  Review of Systems   Review of Systems   Constitutional: Negative for fatigue and fever  HENT: Positive for congestion and postnasal drip  Respiratory: Positive for cough  Negative for shortness of breath, wheezing and stridor            Current Medications       Current Outpatient Medications:     acetaminophen (TYLENOL) 325 mg tablet, Take 2 tablets (650 mg total) by mouth every 6 (six) hours as needed for mild pain (Patient not taking: Reported on 2020), Disp: 30 tablet, Rfl: 0    amLODIPine (NORVASC) 5 mg tablet, TAKE ONE TABLET BY MOUTH EVERY DAY, Disp: 90 tablet, Rfl: 3    aspirin (ECOTRIN LOW STRENGTH) 81 mg EC tablet, Take 1 tablet (81 mg total) by mouth daily, Disp: 30 tablet, Rfl: 0    losartan (COZAAR) 100 MG tablet, Take 1 tablet (100 mg total) by mouth daily, Disp: 90 tablet, Rfl: 3    metoprolol succinate (TOPROL-XL) 50 mg 24 hr tablet, TAKE ONE TABLET BY MOUTH EVERY DAY, Disp: 30 tablet, Rfl: 6    Current Allergies     Allergies as of 07/10/2022 - Reviewed 07/10/2022   Allergen Reaction Noted    Bactrim [sulfamethoxazole-trimethoprim] Hives 2017    Iv contrast [iodinated diagnostic agents]  2019            The following portions of the patient's history were reviewed and updated as appropriate: allergies, current medications, past family history, past medical history, past social history, past surgical history and problem list      Past Medical History:   Diagnosis Date    Abnormal ECG     Bradycardia     CKD (chronic kidney disease)     Dyspnea on effort     Heart block AV second degree     Hypertension     Kidney stone     Left ventricular hypertrophy     Renal insufficiency        Past Surgical History:   Procedure Laterality Date    CARDIAC PACEMAKER PLACEMENT  2017       Family History   Problem Relation Age of Onset    Hypertension Mother     Heart attack Father          Medications have been verified  Objective   /91   Pulse 65   Temp 98 9 °F (37 2 °C)   Resp 18   SpO2 95%   No LMP for male patient  Physical Exam     Physical Exam  Cardiovascular:      Heart sounds: Normal heart sounds  Pulmonary:      Breath sounds: Normal breath sounds  No wheezing, rhonchi or rales             PCR COVID done

## 2022-07-11 LAB — SARS-COV-2 RNA RESP QL NAA+PROBE: NEGATIVE

## 2022-08-09 DIAGNOSIS — I47.1 SVT (SUPRAVENTRICULAR TACHYCARDIA) (HCC): ICD-10-CM

## 2022-08-09 RX ORDER — METOPROLOL SUCCINATE 50 MG/1
TABLET, EXTENDED RELEASE ORAL
Qty: 30 TABLET | Refills: 6 | Status: SHIPPED | OUTPATIENT
Start: 2022-08-09

## 2022-10-25 DIAGNOSIS — I10 HYPERTENSION, UNSPECIFIED TYPE: ICD-10-CM

## 2022-10-26 RX ORDER — LOSARTAN POTASSIUM 100 MG/1
TABLET ORAL
Qty: 90 TABLET | Refills: 3 | Status: SHIPPED | OUTPATIENT
Start: 2022-10-26

## 2022-10-26 RX ORDER — AMLODIPINE BESYLATE 5 MG/1
TABLET ORAL
Qty: 90 TABLET | Refills: 3 | Status: SHIPPED | OUTPATIENT
Start: 2022-10-26

## 2022-11-18 ENCOUNTER — REMOTE DEVICE CLINIC VISIT (OUTPATIENT)
Dept: CARDIOLOGY CLINIC | Facility: CLINIC | Age: 67
End: 2022-11-18

## 2022-11-18 DIAGNOSIS — Z95.0 CARDIAC PACEMAKER IN SITU: Primary | ICD-10-CM

## 2022-11-18 NOTE — PROGRESS NOTES
DR Lj Acosta MDT-DUAL PACEMAKER (AAI-DDD MODE)   CARELINK TRANSMISSION: BATTERY STATUS "4 YRS " AP 47%  100%  ALL AVAILABLE LEAD PARAMETERS WITHIN NORMAL LIMITS  NO SIGNIFICANT HIGH RATE EPISODES  NORMAL DEVICE FUNCTION   NC

## 2022-12-27 NOTE — TELEPHONE ENCOUNTER
office wanted to notify and confirm with the office if they will be signing the death cert for this patient  He was found and pronounced today at 8:10am by the medic that responded 
CHRISTIAN placed in patient chart
Spoke with Raymond Estrada and she will have the death certificate sent over for Dr Valeria Sethi To sign  Please remove patient from our panel 
Pt to consume >75% meals/supplements; maintain stable wt +/-2#

## 2022-12-29 NOTE — TELEPHONE ENCOUNTER
12/29/22 1:37 PM     The office's request has been received, reviewed, and noted that it no longer requires attention  This message will now be completed      Thank you  Gali Abdul

## 2023-05-08 NOTE — PROGRESS NOTES
No results found for any visits on 12/27/19  High Dose Vitamin A Pregnancy And Lactation Text: High dose vitamin A therapy is contraindicated during pregnancy and breast feeding.

## 2023-09-11 NOTE — ASSESSMENT & PLAN NOTE
Likely bug bite  Continue to monitor   · Patient reported that he had tick bite 4 weeks ago and he is worried that he has Lyme disease and some of the symptoms may be a manifestation of his Lyme disease  · No fever or any joint pain  · Obtain a Lyme titer-patient aware that the results is not going to be back by the time he is discharged and needs outpatient follow-up with the PCP